# Patient Record
Sex: FEMALE | Race: OTHER | NOT HISPANIC OR LATINO
[De-identification: names, ages, dates, MRNs, and addresses within clinical notes are randomized per-mention and may not be internally consistent; named-entity substitution may affect disease eponyms.]

---

## 2021-05-30 PROBLEM — Z00.00 ENCOUNTER FOR PREVENTIVE HEALTH EXAMINATION: Status: ACTIVE | Noted: 2021-05-30

## 2021-05-30 PROBLEM — Q63.2 PELVIC KIDNEY: Status: ACTIVE | Noted: 2021-05-30

## 2021-06-01 ENCOUNTER — APPOINTMENT (OUTPATIENT)
Dept: HEART AND VASCULAR | Facility: CLINIC | Age: 61
End: 2021-06-01
Payer: COMMERCIAL

## 2021-06-01 ENCOUNTER — NON-APPOINTMENT (OUTPATIENT)
Age: 61
End: 2021-06-01

## 2021-06-01 VITALS
BODY MASS INDEX: 51.84 KG/M2 | OXYGEN SATURATION: 96 % | DIASTOLIC BLOOD PRESSURE: 81 MMHG | HEART RATE: 76 BPM | SYSTOLIC BLOOD PRESSURE: 161 MMHG | HEIGHT: 55 IN | WEIGHT: 224 LBS

## 2021-06-01 DIAGNOSIS — Z72.0 TOBACCO USE: ICD-10-CM

## 2021-06-01 DIAGNOSIS — Z87.891 PERSONAL HISTORY OF NICOTINE DEPENDENCE: ICD-10-CM

## 2021-06-01 DIAGNOSIS — Q63.2 ECTOPIC KIDNEY: ICD-10-CM

## 2021-06-01 PROCEDURE — 93000 ELECTROCARDIOGRAM COMPLETE: CPT

## 2021-06-01 PROCEDURE — 99204 OFFICE O/P NEW MOD 45 MIN: CPT | Mod: 25

## 2021-06-01 PROCEDURE — 99072 ADDL SUPL MATRL&STAF TM PHE: CPT

## 2021-06-01 RX ORDER — METOPROLOL SUCCINATE 100 MG/1
100 TABLET, EXTENDED RELEASE ORAL
Qty: 90 | Refills: 0 | Status: DISCONTINUED | COMMUNITY
Start: 2020-08-31 | End: 2021-06-01

## 2021-06-01 NOTE — PHYSICAL EXAM

## 2021-06-01 NOTE — REVIEW OF SYSTEMS
[Negative] : Heme/Lymph [SOB] : shortness of breath [Dyspnea on exertion] : dyspnea during exertion [Chest Discomfort] : chest discomfort [Snoring] : snoring [Joint Pain] : joint pain

## 2021-06-01 NOTE — REASON FOR VISIT
[FreeTextEntry1] : Diagnostic Tests:\par -----------------------------------------\par ECG:\par 06/01/21: sinus rhythm, LVH with repolarization abnormality. \par 05/19/17: sinus bradycardia at 58 bpm, otherwise normal ECG.

## 2021-06-01 NOTE — ASSESSMENT
[FreeTextEntry1] : 1. HTN: BP not at ACC/AHA 2017 guideline target: poorly controlled\par        - change Toprol XL 25mg to labetalol 200mg po bid\par        - continue losartan//12.5mg po qd\par        - if BP remains above target next visit will up titrate anti-HTN regimen \par        - will send for an echocardiogram to confirm hypertensive heart disease\par \par 2. Dyslipidemia:\par       - continue atorvastatin 20mg po qd\par       - discussed therapeutic lifestyle changes to promote improved lipid metabolism \par       - check lab work today\par \par 3. DM2:\par      - continue metformin 500mg po qd\par      - discussed with patient therapeutic lifestyle changes to improve glucose metabolism\par      - discussed with patient therapeutic lifestyle changes to improve glucose metabolism \par \par 4. Obesity: BMI 54\par      - we had an extensive discussion about therapeutic lifestyle changes to promote increased cardiovascular fitness and achieving goal weight\par      - will refer to the bariatric center next visit\par \par 5. r/o NIRAV:\par       - will order a home sleep study\par \par An ECG was obtained to evaluate heart rhythm and screen for any underlying cardiac abnormalities. \par \par

## 2021-06-01 NOTE — HISTORY OF PRESENT ILLNESS
[FreeTextEntry1] : Ms. Mackenzie presents for initial evaluation and management of obesity, asthma, DM2, dyslipidemia, HTN, and pelvic kidney.  She was previously followed by a cardiologist years ago but does not remember the details.  Her sister  in her 60s of CAD/CABG.  She has complaints of chest pain over the past several weeks.  She describes the pain as pressure-like, 8/10 in intensity, lasts hours at a time, resolves spontaneously, and is nonexertional.  She has CEDEÑO to 1 flight of stairs.  She admits to snoring.

## 2021-06-02 LAB
ALBUMIN SERPL ELPH-MCNC: 4 G/DL
ALP BLD-CCNC: 166 U/L
ALT SERPL-CCNC: 29 U/L
ANION GAP SERPL CALC-SCNC: 16 MMOL/L
AST SERPL-CCNC: 28 U/L
BASOPHILS # BLD AUTO: 0.06 K/UL
BASOPHILS NFR BLD AUTO: 0.5 %
BILIRUB SERPL-MCNC: 0.2 MG/DL
BUN SERPL-MCNC: 15 MG/DL
CALCIUM SERPL-MCNC: 9.8 MG/DL
CHLORIDE SERPL-SCNC: 97 MMOL/L
CHOLEST SERPL-MCNC: 286 MG/DL
CO2 SERPL-SCNC: 23 MMOL/L
CREAT SERPL-MCNC: 0.8 MG/DL
EOSINOPHIL # BLD AUTO: 0.16 K/UL
EOSINOPHIL NFR BLD AUTO: 1.4 %
ESTIMATED AVERAGE GLUCOSE: 312 MG/DL
GLUCOSE SERPL-MCNC: 332 MG/DL
HBA1C MFR BLD HPLC: 12.5 %
HCT VFR BLD CALC: 43.2 %
HDLC SERPL-MCNC: 45 MG/DL
HGB BLD-MCNC: 13 G/DL
IMM GRANULOCYTES NFR BLD AUTO: 0.3 %
LDLC SERPL CALC-MCNC: NORMAL MG/DL
LDLC SERPL DIRECT ASSAY-MCNC: 195 MG/DL
LYMPHOCYTES # BLD AUTO: 2.85 K/UL
LYMPHOCYTES NFR BLD AUTO: 24.6 %
MAN DIFF?: NORMAL
MCHC RBC-ENTMCNC: 27.3 PG
MCHC RBC-ENTMCNC: 30.1 GM/DL
MCV RBC AUTO: 90.8 FL
MONOCYTES # BLD AUTO: 0.79 K/UL
MONOCYTES NFR BLD AUTO: 6.8 %
NEUTROPHILS # BLD AUTO: 7.68 K/UL
NEUTROPHILS NFR BLD AUTO: 66.4 %
NONHDLC SERPL-MCNC: 242 MG/DL
NT-PROBNP SERPL-MCNC: 237 PG/ML
PLATELET # BLD AUTO: 406 K/UL
POTASSIUM SERPL-SCNC: 4.4 MMOL/L
PROT SERPL-MCNC: 7.4 G/DL
RBC # BLD: 4.76 M/UL
RBC # FLD: 14.5 %
SODIUM SERPL-SCNC: 136 MMOL/L
TRIGL SERPL-MCNC: 456 MG/DL
TSH SERPL-ACNC: 2.18 UIU/ML
WBC # FLD AUTO: 11.58 K/UL

## 2021-06-03 ENCOUNTER — TRANSCRIPTION ENCOUNTER (OUTPATIENT)
Age: 61
End: 2021-06-03

## 2021-06-10 ENCOUNTER — TRANSCRIPTION ENCOUNTER (OUTPATIENT)
Age: 61
End: 2021-06-10

## 2021-06-17 ENCOUNTER — APPOINTMENT (OUTPATIENT)
Dept: HEART AND VASCULAR | Facility: CLINIC | Age: 61
End: 2021-06-17
Payer: COMMERCIAL

## 2021-06-17 PROCEDURE — 99072 ADDL SUPL MATRL&STAF TM PHE: CPT

## 2021-06-17 PROCEDURE — 93306 TTE W/DOPPLER COMPLETE: CPT

## 2021-06-21 ENCOUNTER — NON-APPOINTMENT (OUTPATIENT)
Age: 61
End: 2021-06-21

## 2021-08-02 ENCOUNTER — APPOINTMENT (OUTPATIENT)
Dept: HEART AND VASCULAR | Facility: CLINIC | Age: 61
End: 2021-08-02
Payer: COMMERCIAL

## 2021-08-02 VITALS — DIASTOLIC BLOOD PRESSURE: 61 MMHG | SYSTOLIC BLOOD PRESSURE: 130 MMHG

## 2021-08-02 VITALS
HEART RATE: 72 BPM | WEIGHT: 224 LBS | DIASTOLIC BLOOD PRESSURE: 75 MMHG | SYSTOLIC BLOOD PRESSURE: 146 MMHG | HEIGHT: 55 IN | OXYGEN SATURATION: 94 % | BODY MASS INDEX: 51.84 KG/M2

## 2021-08-02 PROCEDURE — 99214 OFFICE O/P EST MOD 30 MIN: CPT

## 2021-08-02 NOTE — REASON FOR VISIT
[FreeTextEntry1] : Diagnostic Tests:\par -----------------------------------------\par ECG:\par 06/01/21: sinus rhythm, LVH with repolarization abnormality. \par 05/19/17: sinus bradycardia at 58 bpm, otherwise normal ECG. \par -----------------------------------------\par Echo:\par 06/17/21: EF 62%, mildly dilated LA.

## 2021-08-02 NOTE — ASSESSMENT
[FreeTextEntry1] : 1. HTN: BP near ACC/AHA 2017 guideline target: poorly controlled\par        - continue labetalol 200mg po bid\par        - continue losartan//12.5mg po qd\par        - if BP remains above target next visit will up titrate anti-HTN regimen \par \par 2. Dyslipidemia:  on atorvastatin 10mg  (06/02/21)\par       - continue atorvastatin 80mg po qd \par       - discussed therapeutic lifestyle changes to promote improved lipid metabolism \par       - recheck lab work today \par    \par 3. DM2: A1c 12.5% on metformin 500mg po bid  (06/02/21)\par      - continue metformin 1000mg po bid\par      - discussed with patient therapeutic lifestyle changes to improve glucose metabolism\par      - check lab work today\par \par 4. Obesity: BMI 54\par      - we had an extensive discussion about therapeutic lifestyle changes to promote increased cardiovascular fitness and achieving goal weight\par      - will have a sleeve gastrectomy with Guru Chaudhary MD ([phone])\par      - offered a consultation with [facility] (Boby Renee MD) will consider in future\par \par 5. r/o NIRAV:\par       - will re-order a home sleep study\par \par 6. Preoperative for sleeve gastrectomy with Guru Chaudhary MD (fax [phone])\par       - she has no identified CV disease and has > 4 METS of exertional capacity\par       - she may, therefore, proceed with surgery without cardiac contraindications\par \par \par

## 2021-08-02 NOTE — HISTORY OF PRESENT ILLNESS
[FreeTextEntry1] : Ms. Mackenzie presents for follow up and management of obesity, asthma, DM2, dyslipidemia, HTN, and pelvic kidney.  She was previously followed by a cardiologist years ago but does not remember the details.  Her sister  in her 60s of CAD/CABG.  She has complaints of chest pain over the past several weeks.  She describes the pain as pressure-like, 8/10 in intensity, lasts hours at a time, resolves spontaneously, and is nonexertional.  She has CEDEÑO to 1 flight of stairs.  She admits to snoring.  On 21 she had an echocardiogram which revealed an EF of 62% and a mildly dilated left atrium. She will have sleeve gastrectomy with Guru Chaudhary MD at [facility].

## 2021-08-03 LAB
ALBUMIN SERPL ELPH-MCNC: 4 G/DL
ALP BLD-CCNC: 165 U/L
ALT SERPL-CCNC: 22 U/L
ANION GAP SERPL CALC-SCNC: 12 MMOL/L
AST SERPL-CCNC: 21 U/L
BASOPHILS # BLD AUTO: 0.07 K/UL
BASOPHILS NFR BLD AUTO: 0.7 %
BILIRUB SERPL-MCNC: 0.2 MG/DL
BUN SERPL-MCNC: 15 MG/DL
CALCIUM SERPL-MCNC: 10.4 MG/DL
CHLORIDE SERPL-SCNC: 97 MMOL/L
CHOLEST SERPL-MCNC: 248 MG/DL
CO2 SERPL-SCNC: 28 MMOL/L
CREAT SERPL-MCNC: 0.72 MG/DL
EOSINOPHIL # BLD AUTO: 0.26 K/UL
EOSINOPHIL NFR BLD AUTO: 2.6 %
ESTIMATED AVERAGE GLUCOSE: 332 MG/DL
GLUCOSE SERPL-MCNC: 306 MG/DL
HBA1C MFR BLD HPLC: 13.2 %
HCT VFR BLD CALC: 41 %
HDLC SERPL-MCNC: 41 MG/DL
HGB BLD-MCNC: 12.5 G/DL
IMM GRANULOCYTES NFR BLD AUTO: 0.3 %
LDLC SERPL CALC-MCNC: NORMAL MG/DL
LDLC SERPL DIRECT ASSAY-MCNC: 158 MG/DL
LYMPHOCYTES # BLD AUTO: 2.84 K/UL
LYMPHOCYTES NFR BLD AUTO: 27.9 %
MAN DIFF?: NORMAL
MCHC RBC-ENTMCNC: 27.7 PG
MCHC RBC-ENTMCNC: 30.5 GM/DL
MCV RBC AUTO: 90.7 FL
MONOCYTES # BLD AUTO: 0.76 K/UL
MONOCYTES NFR BLD AUTO: 7.5 %
NEUTROPHILS # BLD AUTO: 6.23 K/UL
NEUTROPHILS NFR BLD AUTO: 61 %
NONHDLC SERPL-MCNC: 207 MG/DL
PLATELET # BLD AUTO: 382 K/UL
POTASSIUM SERPL-SCNC: 4.6 MMOL/L
PROT SERPL-MCNC: 7.3 G/DL
RBC # BLD: 4.52 M/UL
RBC # FLD: 14.4 %
SODIUM SERPL-SCNC: 137 MMOL/L
TRIGL SERPL-MCNC: 485 MG/DL
TSH SERPL-ACNC: 2.59 UIU/ML
WBC # FLD AUTO: 10.19 K/UL

## 2021-08-03 RX ORDER — EZETIMIBE 10 MG/1
10 TABLET ORAL DAILY
Qty: 90 | Refills: 3 | Status: ACTIVE | COMMUNITY
Start: 2021-08-03 | End: 1900-01-01

## 2021-08-17 ENCOUNTER — TRANSCRIPTION ENCOUNTER (OUTPATIENT)
Age: 61
End: 2021-08-17

## 2021-08-17 RX ORDER — METFORMIN HYDROCHLORIDE 1000 MG/1
1000 TABLET, FILM COATED, EXTENDED RELEASE ORAL DAILY
Qty: 90 | Refills: 3 | Status: ACTIVE | COMMUNITY
Start: 2020-08-31

## 2021-08-18 ENCOUNTER — APPOINTMENT (OUTPATIENT)
Dept: HEART AND VASCULAR | Facility: CLINIC | Age: 61
End: 2021-08-18
Payer: COMMERCIAL

## 2021-08-18 VITALS
SYSTOLIC BLOOD PRESSURE: 125 MMHG | WEIGHT: 225.31 LBS | HEIGHT: 55 IN | BODY MASS INDEX: 52.14 KG/M2 | HEART RATE: 72 BPM | DIASTOLIC BLOOD PRESSURE: 72 MMHG | TEMPERATURE: 98.3 F | OXYGEN SATURATION: 95 %

## 2021-08-18 PROCEDURE — 99214 OFFICE O/P EST MOD 30 MIN: CPT

## 2021-08-18 NOTE — HISTORY OF PRESENT ILLNESS
[FreeTextEntry1] : Ms. Mackenzie is a 61 year old woman with:\par obesity\par DM\par HTN\par dyslipidemia\par \par Referred for risk factor optimization\par Says in general that she is hesitant to add on more medications unless some are removed\par \par DM:\par on metformin\par has not tried any other medication\par refuses insulin\par follows with her internist, does not have endocrinologist\par \par Obesity:\par being evaluated for sleeve gastrectomy; has just started the process--notes it will take 6 months at least and she hopes in about a year she can have the surgery\par Has appointment with nutritionist tomorrow\par still pending to see GI\par \par HTN:\par internist is uptitrating regimen\par \par Dyslipidemia:\par Has been on statin\par Reluctant about med additions\par \par Lifestyle History:\par Last 2 weeks has changed diet- stopped eating rice, more vegetables/fruits. Drinks a lot of water\par Mediterranean Diet Score (9 question survey) was 5. \par (8-9: optimal, 6-7: near-optimal, 4-5: suboptimal, 0-3: markedly suboptimal)\par Exercise: Limited exercise due to hip pain though does walk a lot for work\par Smoking: Former smoker, quit 8 months ago\par \par No PCOS\par 2 pregnancies\par No complications, no  deliveries\par No miscarriages\par +early menopause: age 38\par No hormone replacement therapy\par \par Recent Labs reviewed:\par /\par Chol 248\par HDL 41\par Trig 485\par \par calculated non-\par A1c 13.2%\par \par Family History: half-sister with triple bypass surgery in her 50s

## 2021-08-18 NOTE — DISCUSSION/SUMMARY
[FreeTextEntry1] : 61 year old woman with obesity, DM, HTN, dyslipidemia who is referred for risk factor modification. \par \par Patient's dietary, exercise and overall lifestyle habits were reviewed. The concept of atherosclerosis and its systemic nature was discussed with a focus on the need to get all cardiovascular risk factors to goal. Specifically, her risk factors of DM, HTN, dyslipidemia, obesity were reviewed in addition to other risk-enhancers such as early menopause. \par \par Glucometabolic State: A1c significantly elevated at 13.2%. Explained to patient that at this level, she needs more therapy than metformin. Ideally would try GLP1RA in addition to metformin as will have benefit for weight loss in addition to glycemic benefit and ASCVD risk reduction. Also recommended endocrine referral and discussion about insulin - she does not want that. She is open to Trulicity and will discuss it with her internist. \par The cardiovascular benefits of GLP-1  agonists were discussed as well as the risks including upset stomach, nausea, vomiting, diarrhea, and weight loss, as well as dizziness and headaches. Pancreatitis and Kidney failure history were reviewed since caution should be taken in patients with a history of either condition. She has no personal or family history of medullary thyroid cancer. She is not on any medications that would cause hypoglycemia. \par -Trulicity prescribed -- will confirm if pt's insurance covers. If she is agreeable, recheck A1c in 4 weeks and uptitrate as needed\par Lipid Therapy: on high intensity statin however LDL still significantly elevated and with elevated triglycerides. Patient has just started dietary changes 2 weeks ago. Emphasized importance of diet and exercise as she is hesitant to further pharmacologic intervention. Would recheck lipid profile in 3 months with diet/exercise changes and rediscuss. \par Hypertension: BP regimen per her primary cardiologist\par Mediterranean Diet: Score 5. She has plan to see nutritionist tomorrow. Discussed incorporating 2 or more servings per day of vegetables, fruits, and whole grains. Increase intake of fish and legumes/beans to 2 or more servings per week. Aim to increase intake of healthy fats, such as olive oil and avocados, and have a handful of nuts/seeds most days. Reduce red/processed meat consumption to 2 or fewer times per week. \par Exercise: Encouraged gradually increasing physical activity to 30-60 minutes most days of the week. Weight loss may help with this as well. \par Obesity/Overweight: Continue with weight management clinic-- GLP1RA as above. When closer to bariatric surgery, would monitor volume status pre and post surgery. \par Sleep Apnea: Pending sleep study. \par \par Recs:\par -GLP1RA for DM and weight loss\par -recheck A1c in 1 month and uptitrate GLP1RA as needed\par -endocrine referral\par -diet/exercise as above\par -recheck lipid profile in 3 months and rediscuss further pharmacologic options for risk reduction\par \par Boby Renee MD\par Cardiovascular Prevention\par [facility]\par

## 2021-08-18 NOTE — PHYSICAL EXAM
[Well Developed] : well developed [Obese] : obese [Normal Conjunctiva] : normal conjunctiva [Normal Venous Pressure] : normal venous pressure [No Carotid Bruit] : no carotid bruit [Normal S1, S2] : normal S1, S2 [Murmur] : murmur [Clear Lung Fields] : clear lung fields [Good Air Entry] : good air entry [No Respiratory Distress] : no respiratory distress  [Soft] : abdomen soft [Non Tender] : non-tender [Normal] : normal gait [No Rash] : no rash [Moves all extremities] : moves all extremities [Alert and Oriented] : alert and oriented [de-identified] : soft [de-identified] : trace LE edema

## 2021-08-18 NOTE — REASON FOR VISIT
[CV Risk Factors and Non-Cardiac Disease] : CV risk factors and non-cardiac disease [FreeTextEntry3] : Dr. Wells [FreeTextEntry1] : referred for risk factor modification

## 2021-08-18 NOTE — REVIEW OF SYSTEMS
[Joint Pain] : joint pain [SOB] : no shortness of breath [Chest Discomfort] : no chest discomfort [FreeTextEntry5] : denies symptoms now, previously complained of dyspnea and chest discomfort

## 2021-08-19 ENCOUNTER — APPOINTMENT (OUTPATIENT)
Dept: SLEEP CENTER | Facility: HOME HEALTH | Age: 61
End: 2021-08-19
Payer: COMMERCIAL

## 2021-08-19 ENCOUNTER — OUTPATIENT (OUTPATIENT)
Dept: OUTPATIENT SERVICES | Facility: HOSPITAL | Age: 61
LOS: 1 days | End: 2021-08-19
Payer: COMMERCIAL

## 2021-08-19 PROCEDURE — 95800 SLP STDY UNATTENDED: CPT | Mod: 26

## 2021-08-19 PROCEDURE — 95800 SLP STDY UNATTENDED: CPT

## 2021-08-20 DIAGNOSIS — G47.33 OBSTRUCTIVE SLEEP APNEA (ADULT) (PEDIATRIC): ICD-10-CM

## 2021-09-20 ENCOUNTER — TRANSCRIPTION ENCOUNTER (OUTPATIENT)
Age: 61
End: 2021-09-20

## 2021-11-30 ENCOUNTER — APPOINTMENT (OUTPATIENT)
Dept: HEART AND VASCULAR | Facility: CLINIC | Age: 61
End: 2021-11-30
Payer: COMMERCIAL

## 2021-11-30 VITALS
TEMPERATURE: 97.3 F | SYSTOLIC BLOOD PRESSURE: 167 MMHG | DIASTOLIC BLOOD PRESSURE: 85 MMHG | HEART RATE: 89 BPM | HEIGHT: 55 IN | BODY MASS INDEX: 50.68 KG/M2 | OXYGEN SATURATION: 95 % | WEIGHT: 219 LBS

## 2021-11-30 VITALS — DIASTOLIC BLOOD PRESSURE: 74 MMHG | SYSTOLIC BLOOD PRESSURE: 143 MMHG

## 2021-11-30 PROCEDURE — 99214 OFFICE O/P EST MOD 30 MIN: CPT

## 2021-11-30 NOTE — HISTORY OF PRESENT ILLNESS
[FreeTextEntry1] : Ms. Mackenzie presents for follow up and management of obesity, asthma, DM2, dyslipidemia, HTN, and pelvic kidney.  She was previously followed by a cardiologist years ago but does not remember the details.  Her sister  in her 60s of CAD/CABG.  She has complaints of chest pain over the past several weeks.  She describes the pain as pressure-like, 8/10 in intensity, lasts hours at a time, resolves spontaneously, and is nonexertional.  She has CEDEÑO to 1 flight of stairs.  She admits to snoring.  On 21 she had an echocardiogram which revealed an EF of 62% and a mildly dilated left atrium. She will have sleeve gastrectomy with Guru Chaudhary MD at [facility].  She was recently seen by Boby Renee MD and started on Trulicity which she is tolerating well.

## 2021-11-30 NOTE — REASON FOR VISIT
[FreeTextEntry1] : Diagnostic Tests:\par -----------------------------------------\par ECG:\par 06/01/21: sinus rhythm, LVH with repolarization abnormality. \par 05/19/17: sinus bradycardia at 58 bpm, otherwise normal ECG. \par -----------------------------------------\par Echo:\par 06/17/21: EF 62%, mildly dilated LA. \par -----------------------------------------\par Sleep Studies:\par 08/19/21; mild NIRAV.

## 2021-11-30 NOTE — ASSESSMENT
[FreeTextEntry1] : 1. HTN: BP near ACC/AHA 2017 guideline target:\par        - continue labetalol 200mg po bid\par        - continue losartan//12.5mg po qd\par        - if BP remains above target next visit will up titrate anti-HTN regimen \par \par 2. Dyslipidemia:  on atorvastatin 10mg  (06/02/21)\par       - continue atorvastatin 80mg po qd \par       - discussed therapeutic lifestyle changes to promote improved lipid metabolism \par       - recheck lab work today \par    \par 3. DM2: A1c 13.2% (08/03/21)\par      - continue metformin 1000mg po bid\par      - continue Trulicity 1.5mg/ml 7ml Sc \par      - discussed with patient therapeutic lifestyle changes to improve glucose metabolism\par      - check lab work today\par \par 4. Obesity: BMI 52.8\par      - we had an extensive discussion about therapeutic lifestyle changes to promote increased cardiovascular fitness and achieving goal weight\par      - will have a sleeve gastrectomy with Guru Chaudhary MD (fax [phone])\par      - follow up with [facility], Boby Renee MD\par \par 5. NIRAV: mild \par       - will pursue weight loss and CV exercise\par \par 6. Preoperative for sleeve gastrectomy with Guru Chaudhary MD (fax [phone])\par       - she has no identified CV disease and has > 4 METS of exertional capacity\par       - she may, therefore, proceed with surgery without cardiac contraindications\par \par \par

## 2021-12-01 LAB
ALBUMIN SERPL ELPH-MCNC: 4 G/DL
ALP BLD-CCNC: 141 U/L
ALT SERPL-CCNC: 12 U/L
ANION GAP SERPL CALC-SCNC: 15 MMOL/L
AST SERPL-CCNC: 15 U/L
BASOPHILS # BLD AUTO: 0.05 K/UL
BASOPHILS NFR BLD AUTO: 0.4 %
BILIRUB SERPL-MCNC: 0.2 MG/DL
BUN SERPL-MCNC: 17 MG/DL
CALCIUM SERPL-MCNC: 10.3 MG/DL
CHLORIDE SERPL-SCNC: 97 MMOL/L
CHOLEST SERPL-MCNC: 188 MG/DL
CO2 SERPL-SCNC: 25 MMOL/L
CREAT SERPL-MCNC: 0.74 MG/DL
EOSINOPHIL # BLD AUTO: 0.28 K/UL
EOSINOPHIL NFR BLD AUTO: 2 %
ESTIMATED AVERAGE GLUCOSE: 220 MG/DL
GLUCOSE SERPL-MCNC: 219 MG/DL
HBA1C MFR BLD HPLC: 9.3 %
HCT VFR BLD CALC: 41.3 %
HDLC SERPL-MCNC: 42 MG/DL
HGB BLD-MCNC: 13 G/DL
IMM GRANULOCYTES NFR BLD AUTO: 0.4 %
LDLC SERPL CALC-MCNC: 81 MG/DL
LDLC SERPL DIRECT ASSAY-MCNC: 103 MG/DL
LYMPHOCYTES # BLD AUTO: 2.98 K/UL
LYMPHOCYTES NFR BLD AUTO: 21.5 %
MAN DIFF?: NORMAL
MCHC RBC-ENTMCNC: 27.8 PG
MCHC RBC-ENTMCNC: 31.5 GM/DL
MCV RBC AUTO: 88.4 FL
MONOCYTES # BLD AUTO: 0.85 K/UL
MONOCYTES NFR BLD AUTO: 6.1 %
NEUTROPHILS # BLD AUTO: 9.68 K/UL
NEUTROPHILS NFR BLD AUTO: 69.6 %
NONHDLC SERPL-MCNC: 146 MG/DL
PLATELET # BLD AUTO: 443 K/UL
POTASSIUM SERPL-SCNC: 4.3 MMOL/L
PROT SERPL-MCNC: 7.4 G/DL
RBC # BLD: 4.67 M/UL
RBC # FLD: 14.4 %
SODIUM SERPL-SCNC: 136 MMOL/L
TRIGL SERPL-MCNC: 324 MG/DL
TSH SERPL-ACNC: 2.21 UIU/ML
WBC # FLD AUTO: 13.89 K/UL

## 2021-12-02 LAB — APO LP(A) SERPL-MCNC: 63 NMOL/L

## 2021-12-13 ENCOUNTER — APPOINTMENT (OUTPATIENT)
Dept: PULMONOLOGY | Facility: CLINIC | Age: 61
End: 2021-12-13
Payer: COMMERCIAL

## 2021-12-13 VITALS
DIASTOLIC BLOOD PRESSURE: 72 MMHG | BODY MASS INDEX: 50.68 KG/M2 | HEIGHT: 55 IN | HEART RATE: 73 BPM | WEIGHT: 219 LBS | SYSTOLIC BLOOD PRESSURE: 132 MMHG | OXYGEN SATURATION: 95 % | TEMPERATURE: 98.2 F

## 2021-12-13 PROCEDURE — 99244 OFF/OP CNSLTJ NEW/EST MOD 40: CPT

## 2021-12-14 NOTE — HISTORY OF PRESENT ILLNESS
[TextBox_4] : 61 year old female, ex-smoker (24 pack years of history, quit in 2020) with mild sleep apnea with obesity (planned for gastric sleeve surgery in January 2022) with ? asthma (uses albuterol, symbicort as needed) was referred to pulmonary clinic by Dr. Wells for SOB on exertion. Patient is c/o SOB while walking half a block on flat ground for last 1 year. Patient is able to climb three flights of stair without stopping but she feels very short of breath. Denies cough, chest pain, pedal edema or fever. 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   [ESS] : 8

## 2021-12-14 NOTE — REVIEW OF SYSTEMS
[Fever] : no fever [Chills] : no chills [Dry Eyes] : no dry eyes [Eye Irritation] : no eye irritation [Cough] : no cough [Sputum] : no sputum [Chest Discomfort] : no chest discomfort [Orthopnea] : no orthopnea [Hay Fever] : no hay fever [Nasal Discharge] : no nasal discharge [GERD] : no gerd [Diarrhea] : no diarrhea [Raynaud] : no raynaud [Telangiectasias] : no telangiectasias [Depression] : no depression [Diabetes] : no diabetes

## 2021-12-14 NOTE — DISCUSSION/SUMMARY
[FreeTextEntry1] : 61 year old female, ex-smoker (24 pack years of history, quit in 2020) with mild sleep apnea (not on CPAP) with obesity (planned for gastric sleeve surgery in January 2022) with ? asthma (uses albuterol, Symbicort as needed) was referred to pulmonary clinic by Dr. Wells for SOB on exertion.\par \par Review:\par Cardiology note\par Labs (11/21): No eosinophilia, bicarbonate level 25\par Sleep study (8/21): Mild sleep apnea, 75% oxygen saturation, 13.7% time below an oxygen saturation of 88%.\par ECHO (6/21): Normal LV function. No Pulmonary HTN\par \par A/P\par Patient has asthma but she does not use Symbicort regularly due to steroid. Patient is using prn albuterol for ? asthma at present. Possibility of COPD vs body habitus as etiology for SOB on exertion.\par Plan:\par - PFT with DLCO\par - Six minute walk test\par - CXR\par - Continue prn albuterol for now. \par - Agree with bariatric surgery for weight loss because body habitus is most likely significant contributor for SOB.

## 2021-12-14 NOTE — CONSULT LETTER
[Dear  ___] : Dear  [unfilled], [Consult Letter:] : I had the pleasure of evaluating your patient, [unfilled]. [Please see my note below.] : Please see my note below. [Consult Closing:] : Thank you very much for allowing me to participate in the care of this patient.  If you have any questions, please do not hesitate to contact me. [FreeTextEntry3] : Sincerely\par \par João Hager MD [facility]\par , [employer]\par Associate , Pulmonary and Critical Care Fellowship\par Pulmonary and Critical Care\par [facility]\par Phone: [phone]\par

## 2021-12-14 NOTE — PHYSICAL EXAM
[No Acute Distress] : no acute distress [Well Nourished] : well nourished [Normal Oropharynx] : normal oropharynx [Normal Appearance] : normal appearance [Normal Rate/Rhythm] : normal rate/rhythm [Normal S1, S2] : normal s1, s2 [No Resp Distress] : no resp distress [Clear to Auscultation Bilaterally] : clear to auscultation bilaterally [No Abnormalities] : no abnormalities [Benign] : benign [Not Tender] : not tender [Normal Gait] : normal gait [No Clubbing] : no clubbing [No Edema] : no edema [Normal Color/ Pigmentation] : normal color/ pigmentation [No Rash] : no rash [No Focal Deficits] : no focal deficits [No Sensory Deficits] : no sensory deficits [Oriented x3] : oriented x3 [Normal Affect] : normal affect [TextBox_2] : o

## 2021-12-17 ENCOUNTER — RESULT REVIEW (OUTPATIENT)
Age: 61
End: 2021-12-17

## 2021-12-17 ENCOUNTER — APPOINTMENT (OUTPATIENT)
Dept: RADIOLOGY | Facility: CLINIC | Age: 61
End: 2021-12-17

## 2021-12-17 ENCOUNTER — OUTPATIENT (OUTPATIENT)
Dept: OUTPATIENT SERVICES | Facility: HOSPITAL | Age: 61
LOS: 1 days | End: 2021-12-17
Payer: COMMERCIAL

## 2021-12-17 PROCEDURE — 71046 X-RAY EXAM CHEST 2 VIEWS: CPT | Mod: 26

## 2021-12-21 ENCOUNTER — APPOINTMENT (OUTPATIENT)
Dept: PULMONOLOGY | Facility: CLINIC | Age: 61
End: 2021-12-21
Payer: COMMERCIAL

## 2021-12-21 VITALS
HEIGHT: 55 IN | TEMPERATURE: 97.6 F | DIASTOLIC BLOOD PRESSURE: 81 MMHG | HEART RATE: 110 BPM | SYSTOLIC BLOOD PRESSURE: 147 MMHG | WEIGHT: 217 LBS | OXYGEN SATURATION: 96 % | BODY MASS INDEX: 50.22 KG/M2

## 2021-12-21 PROCEDURE — ZZZZZ: CPT

## 2021-12-21 PROCEDURE — 94729 DIFFUSING CAPACITY: CPT

## 2021-12-21 PROCEDURE — 94060 EVALUATION OF WHEEZING: CPT

## 2021-12-21 PROCEDURE — 94618 PULMONARY STRESS TESTING: CPT

## 2021-12-21 PROCEDURE — 99214 OFFICE O/P EST MOD 30 MIN: CPT | Mod: 25

## 2021-12-21 PROCEDURE — 94726 PLETHYSMOGRAPHY LUNG VOLUMES: CPT

## 2021-12-22 LAB — SARS-COV-2 N GENE NPH QL NAA+PROBE: NOT DETECTED

## 2021-12-28 NOTE — DISCUSSION/SUMMARY
[FreeTextEntry1] : 61 year old female, ex-smoker (24 pack years of history, quit in 2020) with mild sleep apnea (not on CPAP) with obesity (planned for gastric sleeve surgery in January 2022) with ? asthma (uses albuterol, Symbicort as needed) was referred to pulmonary clinic by Dr. Wells for SOB on exertion.\par \par Review:\par PFT (12 /21 ): FEV1 79, FEV1/FVC 70, , DLCO 74, Rev  7%\par Six minute walk test (12/21): 91% with ambulation, 94% at rest\par Cardiology note\par Labs (11/21): No eosinophilia, bicarbonate level 25\par Sleep study (8/21): Mild sleep apnea, 75% oxygen saturation, 13.7% time below an oxygen saturation of 88%.\par ECHO (6/21): Normal LV function. No Pulmonary HTN\par \par A/P\par PFT is suggestive of mild obstruction secondary to COPD\par Plan:\par - d/c Symbicort. Patient does not want to use steroid\par - Add Anoro 1 puff daily\par - Continue prn albuterol for now. \par - Agree with bariatric surgery for weight loss because body habitus is also most significant contributor for SOB. Her obstruction on PFT is mild.

## 2021-12-28 NOTE — HISTORY OF PRESENT ILLNESS
[TextBox_4] : 61 year old female, ex-smoker (24 pack years of history, quit in 2020) with mild sleep apnea with obesity (planned for gastric sleeve surgery in January 2022) with ? asthma (uses albuterol, symbicort as needed) was referred to pulmonary clinic by Dr. Wells for SOB on exertion. Patient is c/o SOB while walking half a block on flat ground for last 1 year. Patient is able to climb three flights of stair without stopping but she feels very short of breath. Denies cough, chest pain, pedal edema or fever. 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  \par \par 12/21/21:\par Patient came today for follow up. PFT and 6 minute walk test was done.  [ESS] : 8

## 2021-12-28 NOTE — PHYSICAL EXAM
[No Acute Distress] : no acute distress [Well Nourished] : well nourished [Normal Oropharynx] : normal oropharynx [Normal Appearance] : normal appearance [Normal Rate/Rhythm] : normal rate/rhythm [Normal S1, S2] : normal s1, s2 [No Resp Distress] : no resp distress [Clear to Auscultation Bilaterally] : clear to auscultation bilaterally [No Abnormalities] : no abnormalities [Benign] : benign [Not Tender] : not tender [Normal Gait] : normal gait [No Clubbing] : no clubbing [No Edema] : no edema [Normal Color/ Pigmentation] : normal color/ pigmentation [No Rash] : no rash [No Focal Deficits] : no focal deficits [No Sensory Deficits] : no sensory deficits [Oriented x3] : oriented x3 [Normal Affect] : normal affect

## 2022-02-08 ENCOUNTER — APPOINTMENT (OUTPATIENT)
Dept: PULMONOLOGY | Facility: CLINIC | Age: 62
End: 2022-02-08
Payer: COMMERCIAL

## 2022-02-08 VITALS
DIASTOLIC BLOOD PRESSURE: 85 MMHG | OXYGEN SATURATION: 94 % | TEMPERATURE: 98.4 F | HEART RATE: 90 BPM | HEIGHT: 55 IN | WEIGHT: 201 LBS | SYSTOLIC BLOOD PRESSURE: 138 MMHG | BODY MASS INDEX: 46.52 KG/M2

## 2022-02-08 PROCEDURE — 99214 OFFICE O/P EST MOD 30 MIN: CPT

## 2022-02-08 RX ORDER — ALBUTEROL SULFATE 90 UG/1
108 (90 BASE) INHALANT RESPIRATORY (INHALATION)
Qty: 1 | Refills: 5 | Status: ACTIVE | COMMUNITY
Start: 2022-02-08 | End: 1900-01-01

## 2022-02-08 RX ORDER — UMECLIDINIUM BROMIDE AND VILANTEROL TRIFENATATE 62.5; 25 UG/1; UG/1
62.5-25 POWDER RESPIRATORY (INHALATION)
Qty: 1 | Refills: 5 | Status: DISCONTINUED | COMMUNITY
Start: 2021-12-21 | End: 2022-02-08

## 2022-02-08 RX ORDER — BUDESONIDE AND FORMOTEROL FUMARATE DIHYDRATE 160; 4.5 UG/1; UG/1
160-4.5 AEROSOL RESPIRATORY (INHALATION)
Refills: 0 | Status: DISCONTINUED | COMMUNITY
End: 2022-02-08

## 2022-02-08 NOTE — DISCUSSION/SUMMARY
[FreeTextEntry1] : 61 year old female, ex-smoker (24 pack years of history, quit in 2020) with mild sleep apnea (not on CPAP) with obesity (planned for gastric sleeve surgery in January 2022) with ? asthma (uses albuterol, Symbicort as needed) was referred to pulmonary clinic by Dr. Wells for SOB on exertion.\par \par Review:\par PFT (12 /21 ): FEV1 79, FEV1/FVC 70, , DLCO 74, Rev  7%\par Six minute walk test (12/21): 91% with ambulation, 94% at rest\par Cardiology note\par Labs (11/21): No eosinophilia, bicarbonate level 25\par Sleep study (8/21): Mild sleep apnea, 75% oxygen saturation, 13.7% time below an oxygen saturation of 88%.\par ECHO (6/21): Normal LV function. No Pulmonary HTN\par \par A/P\par PFT is suggestive of mild obstruction secondary to COPD. SOB is predominantly due to weight. Gastric bypass surgery done. SOB is better after loosing 25 pounds.\par Plan:\par - d/c Anoro. Add prn albuterol\par - follow up as needed.

## 2022-02-08 NOTE — HISTORY OF PRESENT ILLNESS
[TextBox_4] : 61 year old female, ex-smoker (24 pack years of history, quit in 2020) with mild sleep apnea with obesity (planned for gastric sleeve surgery in January 2022) with ? asthma (uses albuterol, Symbicort as needed) was referred to pulmonary clinic by Dr. Wells for SOB on exertion. Patient is c/o SOB while walking half a block on flat ground for last 1 year. Patient is able to climb three flights of stair without stopping but she feels very short of breath. Denies cough, chest pain, pedal edema or fever. 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  \par \par 12/21/21:\par Patient came today for follow up. PFT and 6 minute walk test was done. \par \par 2/8/21:\par Symbicort was switched to Anoro during last visit. Patient came for follow up. Patient had gastric bypass surgery. She has lost 25 pounds. She denies SOB. She does not think Anoro inhaler helps her anymore.  [ESS] : 8

## 2022-05-28 ENCOUNTER — RX RENEWAL (OUTPATIENT)
Age: 62
End: 2022-05-28

## 2022-05-28 RX ORDER — LABETALOL HYDROCHLORIDE 200 MG/1
200 TABLET, FILM COATED ORAL
Qty: 180 | Refills: 3 | Status: ACTIVE | COMMUNITY
Start: 2021-06-01 | End: 1900-01-01

## 2023-03-28 ENCOUNTER — APPOINTMENT (OUTPATIENT)
Dept: HEART AND VASCULAR | Facility: CLINIC | Age: 63
End: 2023-03-28
Payer: COMMERCIAL

## 2023-03-28 ENCOUNTER — NON-APPOINTMENT (OUTPATIENT)
Age: 63
End: 2023-03-28

## 2023-03-28 VITALS — DIASTOLIC BLOOD PRESSURE: 78 MMHG | SYSTOLIC BLOOD PRESSURE: 131 MMHG

## 2023-03-28 VITALS
SYSTOLIC BLOOD PRESSURE: 137 MMHG | HEART RATE: 54 BPM | BODY MASS INDEX: 43.28 KG/M2 | HEIGHT: 55 IN | OXYGEN SATURATION: 98 % | DIASTOLIC BLOOD PRESSURE: 76 MMHG | WEIGHT: 187 LBS

## 2023-03-28 PROCEDURE — 93000 ELECTROCARDIOGRAM COMPLETE: CPT

## 2023-03-28 PROCEDURE — 99214 OFFICE O/P EST MOD 30 MIN: CPT | Mod: 25

## 2023-03-28 NOTE — ASSESSMENT
[FreeTextEntry1] : 1. HTN: BP near ACC/AHA 2017 guideline target:\par        - continue labetalol 200mg po bid (encouraged to take it bid as opposed to qd) \par        - continue losartan//12.5mg 0.5 tab po qd\par        - if BP remains above target next visit will up titrate anti-HTN regimen \par \par 2. Dyslipidemia:  on atorvastatin 10mg  (06/02/21)\par       - continue atorvastatin 80mg po qd \par       - discussed therapeutic lifestyle changes to promote improved lipid metabolism \par       - patient will provide copy of recent lab work from PMD's office for my review  \par    \par 3. DM2: A1c 13.2% (08/03/21): \par      - continue metformin 1000mg po bid\par      - continue Trulicity 1.5mg/ml 7ml Sc \par      - discussed with patient therapeutic lifestyle changes to improve glucose metabolism\par      - patient will provide copy of recent lab work from PMD's office for my review \par \par 4. Obesity, s/p sleeve gastrectomy 02/2022: BMI 45.1: \par      - we had an extensive discussion about therapeutic lifestyle changes to promote increased cardiovascular fitness and achieving goal weight\par      - follow up with [facility], Boby Renee MD\par \par 5. NIRAV: mild \par       - will pursue weight loss and CV exercise\par \par \par \par

## 2023-03-28 NOTE — REASON FOR VISIT
[Other: ____] : [unfilled] [FreeTextEntry1] : Diagnostic Tests:\par -----------------------------------------\par ECG:\par 03/28/23: sinus bradycardia at 50 bpm, LVH with repolarization abnormalities.  \par 06/01/21: sinus rhythm, LVH with repolarization abnormality. \par 05/19/17: sinus bradycardia at 58 bpm, otherwise normal ECG. \par -----------------------------------------\par Echo:\par 06/17/21: EF 62%, mildly dilated LA. \par -----------------------------------------\par Sleep Studies:\par 08/19/21; mild NIRAV.

## 2023-03-28 NOTE — HISTORY OF PRESENT ILLNESS
[FreeTextEntry1] : Ms. Mackenzie presents for follow up and management of obesity (s/p sleeve gastrectomy), asthma, DM2, dyslipidemia, HTN, and pelvic kidney.  She was last seen by me in the office on 11/30/21.  She underwent sleeve gastrectomy on 01/19/22.  Of note, she is only taking labetalol qd and her PMD reduced the dose of losartan/HCT to 0.5 tab po qd secondary dizziness without hypotension or bradycardia.  Her peak weight was 225 pounds and is now 187 pounds.  AT present, she denies chest pain and has CEDEÑO to 1-2 blocks ambulation.

## 2023-03-28 NOTE — REVIEW OF SYSTEMS
[Snoring] : snoring [Joint Pain] : joint pain [Negative] : Heme/Lymph [SOB] : no shortness of breath [Dyspnea on exertion] : dyspnea during exertion [Chest Discomfort] : no chest discomfort

## 2023-05-21 NOTE — PHYSICAL EXAM
[No Acute Distress] : no acute distress [Normal Rate/Rhythm] : normal rate/rhythm [Normal S1, S2] : normal s1, s2 [No Clubbing] : no clubbing [No Edema] : no edema [Oriented x3] : oriented x3 [Normal Affect] : normal affect

## 2023-05-22 ENCOUNTER — APPOINTMENT (OUTPATIENT)
Dept: PULMONOLOGY | Facility: CLINIC | Age: 63
End: 2023-05-22
Payer: COMMERCIAL

## 2023-05-22 VITALS
WEIGHT: 187 LBS | HEART RATE: 59 BPM | SYSTOLIC BLOOD PRESSURE: 183 MMHG | TEMPERATURE: 97.3 F | BODY MASS INDEX: 43.28 KG/M2 | OXYGEN SATURATION: 96 % | DIASTOLIC BLOOD PRESSURE: 83 MMHG | HEIGHT: 55 IN

## 2023-05-22 DIAGNOSIS — J45.909 UNSPECIFIED ASTHMA, UNCOMPLICATED: ICD-10-CM

## 2023-05-22 PROCEDURE — 99214 OFFICE O/P EST MOD 30 MIN: CPT

## 2023-05-22 NOTE — HISTORY OF PRESENT ILLNESS
[Former] : former [TextBox_4] : 61 year old female, ex-smoker (24 pack years of history, quit in 2020) with mild sleep apnea with obesity (planned for gastric sleeve surgery in January 2022) with ? asthma (uses albuterol, Symbicort as needed) was referred to pulmonary clinic by Dr. Wells for SOB on exertion. Patient is c/o SOB while walking half a block on flat ground for last 1 year. Patient is able to climb three flights of stair without stopping but she feels very short of breath. Denies cough, chest pain, pedal edema or fever. 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 \par \par 12/21/21:\par Patient came today for follow up. PFT and 6 minute walk test was done. \par \par 2/8/21:\par Symbicort was switched to Anoro during last visit. Patient came for follow up. Patient had gastric bypass surgery. She has lost 25 pounds. She denies SOB. She does not think Anoro inhaler helps her anymore. \par \par 5/23/23:\par C/o SOB even while taking Anoro. She has gained weight back since last visit 1 year back.  [ESS] : 8

## 2023-05-22 NOTE — DISCUSSION/SUMMARY
[FreeTextEntry1] : 61 year old female, ex-smoker (24 pack years of history, quit in 2020) with mild sleep apnea (not on CPAP) with obesity (planned for gastric sleeve surgery in January 2022) with ? asthma (uses albuterol, Symbicort as needed) was referred to pulmonary clinic by Dr. Wells for SOB on exertion.\par \par Review:\par PFT (12 /21 ): FEV1 79, FEV1/FVC 70, , DLCO 74, Rev 7%\par Six minute walk test (12/21): 91% with ambulation, 94% at rest\par Cardiology note\par Labs (11/21): No eosinophilia, bicarbonate level 25\par Sleep study (8/21): Mild sleep apnea, 75% oxygen saturation, 13.7% time below an oxygen saturation of 88%.\par ECHO (6/21): Normal LV function. No Pulmonary HTN\par \par A/P\par (1) SOB\par PFT is suggestive of mild obstruction secondary to COPD/asthma. SOB is predominantly due to weight. \par Plan:\par - d/c Anoro. Start Breo inhaler once a day with gargle. \par - Prn albuterol to continue. \par - PFT during next visit\par - follow up after 1 month\par \par  (2) Mild sleep apnea\par - Obesity hypoventilation\par - denies symptoms associated with sleep apnea. \par - Not on CPAP sp far

## 2023-05-23 RX ORDER — FLUTICASONE FUROATE AND VILANTEROL TRIFENATATE 200; 25 UG/1; UG/1
200-25 POWDER RESPIRATORY (INHALATION)
Qty: 1 | Refills: 5 | Status: DISCONTINUED | COMMUNITY
Start: 2023-05-22 | End: 2023-05-23

## 2023-07-07 NOTE — PHYSICAL EXAM
[No Acute Distress] : no acute distress [Normal Oropharynx] : normal oropharynx [Normal Appearance] : normal appearance [Normal Rate/Rhythm] : normal rate/rhythm [Normal S1, S2] : normal s1, s2 [No Resp Distress] : no resp distress [Clear to Auscultation Bilaterally] : clear to auscultation bilaterally [No Clubbing] : no clubbing [No Edema] : no edema [Oriented x3] : oriented x3 [Normal Affect] : normal affect

## 2023-07-10 ENCOUNTER — APPOINTMENT (OUTPATIENT)
Dept: PULMONOLOGY | Facility: CLINIC | Age: 63
End: 2023-07-10
Payer: COMMERCIAL

## 2023-07-10 VITALS
HEART RATE: 60 BPM | SYSTOLIC BLOOD PRESSURE: 110 MMHG | WEIGHT: 187 LBS | BODY MASS INDEX: 43.28 KG/M2 | OXYGEN SATURATION: 95 % | HEIGHT: 55 IN | DIASTOLIC BLOOD PRESSURE: 69 MMHG | TEMPERATURE: 97.4 F

## 2023-07-10 DIAGNOSIS — J44.9 CHRONIC OBSTRUCTIVE PULMONARY DISEASE, UNSPECIFIED: ICD-10-CM

## 2023-07-10 DIAGNOSIS — M19.90 UNSPECIFIED OSTEOARTHRITIS, UNSPECIFIED SITE: ICD-10-CM

## 2023-07-10 PROCEDURE — 94618 PULMONARY STRESS TESTING: CPT

## 2023-07-10 PROCEDURE — ZZZZZ: CPT

## 2023-07-10 PROCEDURE — 94727 GAS DIL/WSHOT DETER LNG VOL: CPT

## 2023-07-10 PROCEDURE — 99214 OFFICE O/P EST MOD 30 MIN: CPT | Mod: 25

## 2023-07-10 PROCEDURE — 94060 EVALUATION OF WHEEZING: CPT

## 2023-07-10 PROCEDURE — 94729 DIFFUSING CAPACITY: CPT

## 2023-07-10 RX ORDER — FLUTICASONE PROPIONATE AND SALMETEROL 500; 50 UG/1; UG/1
500-50 POWDER RESPIRATORY (INHALATION)
Qty: 1 | Refills: 5 | Status: DISCONTINUED | COMMUNITY
Start: 2023-05-23 | End: 2023-07-10

## 2023-07-11 ENCOUNTER — NON-APPOINTMENT (OUTPATIENT)
Age: 63
End: 2023-07-11

## 2023-07-11 LAB — RHEUMATOID FACT SER QL: <10 IU/ML

## 2023-07-12 ENCOUNTER — NON-APPOINTMENT (OUTPATIENT)
Age: 63
End: 2023-07-12

## 2023-07-12 PROBLEM — J44.9 CHRONIC OBSTRUCTIVE PULMONARY DISEASE, UNSPECIFIED COPD TYPE: Status: ACTIVE | Noted: 2021-12-21

## 2023-07-12 LAB
CCP AB SER IA-ACNC: <8 UNITS
RF+CCP IGG SER-IMP: NEGATIVE

## 2023-07-12 NOTE — HISTORY OF PRESENT ILLNESS
[Former] : former [TextBox_4] : 61 year old female, ex-smoker (24 pack years of history, quit in 2020) with mild sleep apnea with obesity (planned for gastric sleeve surgery in January 2022) with ? asthma (uses albuterol, Symbicort as needed) was referred to pulmonary clinic by Dr. Wells for SOB on exertion. Patient is c/o SOB while walking half a block on flat ground for last 1 year. Patient is able to climb three flights of stair without stopping but she feels very short of breath. Denies cough, chest pain, pedal edema or fever. 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 \par \par 12/21/21:\par Patient came today for follow up. PFT and 6 minute walk test was done. \par \par 2/8/21:\par Symbicort was switched to Anoro during last visit. Patient came for follow up. Patient had gastric bypass surgery. She has lost 25 pounds. She denies SOB. She does not think Anoro inhaler helps her anymore. \par \par 5/23/23:\par C/o SOB even while taking Anoro. She has gained weight back since last visit 1 year back. \par \par 7/10/23: \par Patient started Breo at last visit, did not notice difference from using Breo instead of Anoro. Feels that she has some chest pressure while walking.

## 2023-07-12 NOTE — DISCUSSION/SUMMARY
[FreeTextEntry1] : 61 year old female, ex-smoker (24 pack years of history, quit in 2020) with mild sleep apnea (not on CPAP) with obesity (planned for gastric sleeve surgery in January 2022) with ? asthma (uses albuterol, Symbicort as needed) was referred to pulmonary clinic by Dr. Finkelstein for SOB on exertion.\par \par Review:\par PFT (12 /21): FEV1 79, FEV1/FVC 70, , DLCO 74, Rev 7%\par Six minute walk test (12/21): 91% with ambulation, 94% at rest\par PFT 7/10/23): FEV 1 79, FEV1/FVC 73, , DLCO 65, REV 4%\par 6MWT 7/10/23: 93% on ambulation, 95% at rest \par \par Cardiology note\par Labs (11/21): No eosinophilia, bicarbonate level 25\par Sleep study (8/21): Mild sleep apnea, 75% oxygen saturation, 13.7% time below an oxygen saturation of 88%.\par ECHO (6/21): Normal LV function. No Pulmonary HTN\par \par A/P\par (1) SOB\par PFT is suggestive of mild obstruction secondary to COPD/asthma. SOB likely predominantly due to weight. Sees Dr. Finkelstein. \par Plan:\par - D/C Breo inhaler as patient did not perceive benefit, resume Anoro\par - PRN albuterol to continue\par - Advised for weight loss.\par \par  (2) Mild sleep apnea\par - Obesity hypoventilation\par - Denies symptoms associated with sleep apnea. \par - Not on CPAP so far. \par \par (3) Chest tightness/pressure; Pain sounds musculoskeletal in nature. Has been worked-up by cardiology with normal echo. Patient also with arthralgias R hip and wrist. Does not believe she's had any rheumatology workup thus far. \par - WALTER, ccp Ab, Rf ordered today \par - Pending results, may need follow-up with orthopedist or PCP for MSK complaints if negative \par

## 2023-07-12 NOTE — REVIEW OF SYSTEMS
[Chest Tightness] : chest tightness [Arthralgias] : arthralgias [Fever] : no fever [Fatigue] : no fatigue [Chills] : no chills [Dry Eyes] : no dry eyes [Eye Irritation] : no eye irritation [Nasal Congestion] : no nasal congestion [Cough] : no cough [Hemoptysis] : no hemoptysis [Sputum] : no sputum [Dyspnea] : no dyspnea [SOB on Exertion] : no sob on exertion [Chest Discomfort] : no chest discomfort [Edema] : no edema [Orthopnea] : no orthopnea [Hay Fever] : no hay fever [GERD] : no gerd [Headache] : no headache [Dizziness] : no dizziness

## 2023-07-13 ENCOUNTER — NON-APPOINTMENT (OUTPATIENT)
Age: 63
End: 2023-07-13

## 2023-07-13 LAB — ANA SER IF-ACNC: NEGATIVE

## 2023-07-27 ENCOUNTER — RX RENEWAL (OUTPATIENT)
Age: 63
End: 2023-07-27

## 2023-07-27 RX ORDER — ATORVASTATIN CALCIUM 80 MG/1
80 TABLET, FILM COATED ORAL DAILY
Qty: 90 | Refills: 3 | Status: ACTIVE | COMMUNITY
Start: 2022-05-28 | End: 1900-01-01

## 2023-09-06 ENCOUNTER — APPOINTMENT (OUTPATIENT)
Dept: OPHTHALMOLOGY | Facility: CLINIC | Age: 63
End: 2023-09-06
Payer: COMMERCIAL

## 2023-09-06 ENCOUNTER — NON-APPOINTMENT (OUTPATIENT)
Age: 63
End: 2023-09-06

## 2023-09-06 PROCEDURE — 92002 INTRM OPH EXAM NEW PATIENT: CPT

## 2023-09-06 PROCEDURE — 92020 GONIOSCOPY: CPT

## 2023-09-19 ENCOUNTER — TRANSCRIPTION ENCOUNTER (OUTPATIENT)
Age: 63
End: 2023-09-19

## 2023-10-04 ENCOUNTER — APPOINTMENT (OUTPATIENT)
Dept: ORTHOPEDIC SURGERY | Facility: CLINIC | Age: 63
End: 2023-10-04
Payer: COMMERCIAL

## 2023-10-04 ENCOUNTER — NON-APPOINTMENT (OUTPATIENT)
Age: 63
End: 2023-10-04

## 2023-10-04 ENCOUNTER — APPOINTMENT (OUTPATIENT)
Dept: OPHTHALMOLOGY | Facility: CLINIC | Age: 63
End: 2023-10-04
Payer: COMMERCIAL

## 2023-10-04 VITALS — WEIGHT: 196 LBS | HEIGHT: 55 IN | RESPIRATION RATE: 16 BRPM | BODY MASS INDEX: 45.36 KG/M2

## 2023-10-04 DIAGNOSIS — M16.0 BILATERAL PRIMARY OSTEOARTHRITIS OF HIP: ICD-10-CM

## 2023-10-04 DIAGNOSIS — M70.61 TROCHANTERIC BURSITIS, RIGHT HIP: ICD-10-CM

## 2023-10-04 PROCEDURE — 99203 OFFICE O/P NEW LOW 30 MIN: CPT

## 2023-10-04 PROCEDURE — 92012 INTRM OPH EXAM EST PATIENT: CPT

## 2023-10-04 PROCEDURE — 72190 X-RAY EXAM OF PELVIS: CPT

## 2023-12-01 ENCOUNTER — EMERGENCY (EMERGENCY)
Facility: HOSPITAL | Age: 63
LOS: 1 days | Discharge: HOME | End: 2023-12-01
Attending: STUDENT IN AN ORGANIZED HEALTH CARE EDUCATION/TRAINING PROGRAM | Admitting: STUDENT IN AN ORGANIZED HEALTH CARE EDUCATION/TRAINING PROGRAM
Payer: COMMERCIAL

## 2023-12-01 VITALS
OXYGEN SATURATION: 97 % | RESPIRATION RATE: 18 BRPM | SYSTOLIC BLOOD PRESSURE: 180 MMHG | DIASTOLIC BLOOD PRESSURE: 71 MMHG | HEART RATE: 74 BPM | TEMPERATURE: 98 F

## 2023-12-01 PROCEDURE — 99284 EMERGENCY DEPT VISIT MOD MDM: CPT

## 2023-12-01 RX ORDER — KETOROLAC TROMETHAMINE 30 MG/ML
15 SYRINGE (ML) INJECTION ONCE
Refills: 0 | Status: DISCONTINUED | OUTPATIENT
Start: 2023-12-01 | End: 2023-12-01

## 2023-12-01 RX ORDER — ACETAMINOPHEN 500 MG
650 TABLET ORAL ONCE
Refills: 0 | Status: COMPLETED | OUTPATIENT
Start: 2023-12-01 | End: 2023-12-01

## 2023-12-01 RX ADMIN — Medication 650 MILLIGRAM(S): at 23:56

## 2023-12-01 RX ADMIN — Medication 15 MILLIGRAM(S): at 23:56

## 2023-12-01 NOTE — ED ADULT NURSE NOTE - OBJECTIVE STATEMENT
BREAK RN: pt received to [facility] and ambulatory at baseline, c/o R thigh and R hip pain and swelling gradually worsening over 2 weeks, history of arthritis, used to take shots for it but MD stopped due to risk for muscle atrophy. pt reports difficulty with ambulation due to pain. pt has been going to PT 2x/ week for 3 weeks with no improvement. +pedal pulse, +sensation, skin warm and dry, coloring appropriate for race. pt denies recent travel, sob, chest pain, calf pain/tenderness, numbness/tingling, no acute distress noted at this time. respirations even and nonlabored on room air. comfort and safety maintained. pt pending XR, US

## 2023-12-01 NOTE — ED ADULT NURSE NOTE - NSFALLRISKINTERV_ED_ALL_ED
Assistance OOB with selected safe patient handling equipment if applicable/Assistance with ambulation/Communicate fall risk and risk factors to all staff, patient, and family/Monitor gait and stability/Provide visual cue: yellow wristband, yellow gown, etc/Reinforce activity limits and safety measures with patient and family/Call bell, personal items and telephone in reach/Instruct patient to call for assistance before getting out of bed/chair/stretcher/Non-slip footwear applied when patient is off stretcher/[place] to call system/Physically safe environment - no spills, clutter or unnecessary equipment/Purposeful Proactive Rounding/Room/bathroom lighting operational, light cord in reach

## 2023-12-01 NOTE — ED ADULT TRIAGE NOTE - CHIEF COMPLAINT QUOTE
Pt st" For 2 weeks I have rt thigh swelling and I am having a lot of pain in the whole rt leg , I am going to PT for 3 weeks....because my MD recommended I am getting shot for arthritis and he does not want to give me anymore shots..." Hx DM2, htn,,arthritis. + rt thigh swelling.  Denies cp , no shortness of breath.

## 2023-12-02 VITALS
HEART RATE: 65 BPM | DIASTOLIC BLOOD PRESSURE: 65 MMHG | SYSTOLIC BLOOD PRESSURE: 150 MMHG | OXYGEN SATURATION: 98 % | TEMPERATURE: 98 F | RESPIRATION RATE: 16 BRPM

## 2023-12-02 PROCEDURE — 73610 X-RAY EXAM OF ANKLE: CPT | Mod: 26,RT

## 2023-12-02 PROCEDURE — 73590 X-RAY EXAM OF LOWER LEG: CPT | Mod: 26,RT

## 2023-12-02 PROCEDURE — 93971 EXTREMITY STUDY: CPT | Mod: 26,LT

## 2023-12-02 PROCEDURE — 73552 X-RAY EXAM OF FEMUR 2/>: CPT | Mod: 26,RT

## 2023-12-02 PROCEDURE — 72170 X-RAY EXAM OF PELVIS: CPT | Mod: 26

## 2023-12-02 NOTE — ED PROVIDER NOTE - PHYSICAL EXAMINATION
Sole FISHER:  VITALS: Initial triage and subsequent vitals have been reviewed by me.  GEN APPEARANCE: Alert, cooperative. Non-toxic appearing. Well appearing. NAD.  HEAD: Atraumatic, normocephalic   EYES: PERRLa, EOMI, vision grossly intact.   EARS: Gross hearing intact.   NOSE: No nasal discharge, no external evidence of epistaxis.  NECK: Supple  CV: RRR, S1S2, no c/r/m/g. No cyanosis. Extremities warm, well perfused. Cap refill <2 seconds. No bruits.   LUNGS: CTAB. No wheezing. No rales. No rhonchi. No diminished breath sounds.   ABDOMEN: Soft, NTND. No guarding or rebound. No masses.   MSK/EXT: Spine appears normal, no spine point tenderness. No CVA ttp. Normal muscular development. Pelvis stable. No obvious joint or bony deformity, no peripheral edema. +2 DP RLE compartments soft pain w passive flexion of hip, knee no overlying erythema, compartments are soft.   NEURO: Alert, follows commands. Weight bearing normal. Speech normal. Sensation and motor normal x4 extremities.   SKIN: Normal color for race, warm, dry and intact. No evidence of rash.  PSYCH: Normal mood and affect.

## 2023-12-02 NOTE — ED PROVIDER NOTE - PROGRESS NOTE DETAILS
Mao Hernandez MD, PGY2  Imaging nonactionable. Pt able to ambulate, given cane. Feeling better with toradol and tylenol. Will continue taking ibuprofen and tylenol at home. Pt will follow up with her orthopedist. Gianluca rankin. Answered all questions and gave return precautions.

## 2023-12-02 NOTE — ED PROVIDER NOTE - PATIENT PORTAL LINK FT
You can access the FollowMyHealth Patient Portal offered by [facility] by registering at the following website: [email or web address]. By joining [facility]’s FollowMyHealth portal, you will also be able to view your health information using other applications (apps) compatible with our system.

## 2023-12-02 NOTE — ED PROVIDER NOTE - CLINICAL SUMMARY MEDICAL DECISION MAKING FREE TEXT BOX
Sole FISHER: Exam vital signs are stable nontoxic exam with physical exam as above, DDx unclear etiology of patient's right lower extremity pain however low suspicion for acute infectious etiology DVT occult fracture or compartment syndrome his compartments are soft.  Consider possible arthritis or musculoskeletal etiology.  Would also consider possible radiated pain from radiculopathy though patient does not have any back pain.  We will check ultrasound to evaluate for DVT will get x-rays of extremities will give meds as needed and reassess anticipate discharge with close orthopedic/PMD follow-up would also recommend patient to consider seeing a vascular surgeon outpatient for possible peripheral vascular disease however patient does have a good pulse in the distal right lower extremity her extremities appear warm and well-perfused.

## 2023-12-02 NOTE — ED PROVIDER NOTE - NSFOLLOWUPINSTRUCTIONS_ED_ALL_ED_FT
We recommend that you rest, ice and take tylenol(650 mg up to three times a day)/motrin(600 mg up to three times a day) for your symptoms.     -- Please follow-up with your orthopedist in the next 1-2 weeks, but see medical sooner if your symptoms persist or worsen.  Please call tomorrow for an appointment.  If you cannot follow-up with your primary doctor please return to the ED for any urgent issues.  -- You were given a copy of your imaging.  Please go-over these with your doctor(s).   -- If you have any worsening of symptoms or any other concerns please see your doctor or return to the ED immediately.

## 2023-12-02 NOTE — ED PROVIDER NOTE - OBJECTIVE STATEMENT
Sole FISHER: 63-year-old female history of hypertension presents with a chief complaint of worsening right lower extremity pain as well as swelling of the right lower extremity worse at the proximal thigh area.  Patient reports her pain is maximal over the right hip and the right knee worse with ambulation this has been going on for some time has been taking NSAIDs and pregabalin at home without improvement, was followed by orthopedics but unclear what the diagnosis were but she has a known history of arthritis, she reports she can move everything and feel everything, she was going to PT but the pain is getting worse prompting her visit.  Despite the pain she is able to ambulate.  Has not seen a vascular surgeon.  She can move everything and feel everything no rash diffuse here no nausea no vomiting no chest pain no trouble breathing no back pain no abdominal pain no urinary or bowel complaints.

## 2023-12-02 NOTE — ED ADULT NURSE REASSESSMENT NOTE - NS ED NURSE REASSESS COMMENT FT1
Pt at baseline mental status, able to ambulate with cane without assistance. NAD noted, RR even and unlabored. Safety in place

## 2023-12-02 NOTE — ED PROVIDER NOTE - ATTENDING CONTRIBUTION TO CARE
I have personally performed a face to face medical and diagnostic evaluation of the patient. I have discussed with and reviewed the Resident's note and agree with the History, ROS, Physical Exam and MDM unless otherwise indicated. A brief summary of my personal evaluation and impression can be found below.    Sole FISHER: Please see the HPI, ROS, PE and MDM as authored by me.

## 2024-05-04 PROBLEM — E11.9 DIABETES: Status: ACTIVE | Noted: 2021-05-30

## 2024-05-04 PROBLEM — E78.5 DYSLIPIDEMIA: Status: ACTIVE | Noted: 2021-05-30

## 2024-05-04 PROBLEM — E66.9 OBESITY: Status: ACTIVE | Noted: 2021-08-18

## 2024-05-04 PROBLEM — I10 HTN (HYPERTENSION): Status: ACTIVE | Noted: 2021-05-30

## 2024-05-04 PROBLEM — R06.02 SHORTNESS OF BREATH ON EXERTION: Status: ACTIVE | Noted: 2021-12-13

## 2024-05-04 PROBLEM — G47.33 OSA (OBSTRUCTIVE SLEEP APNEA): Status: ACTIVE | Noted: 2021-06-01

## 2024-05-06 ENCOUNTER — APPOINTMENT (OUTPATIENT)
Dept: HEART AND VASCULAR | Facility: CLINIC | Age: 64
End: 2024-05-06
Payer: COMMERCIAL

## 2024-05-06 ENCOUNTER — NON-APPOINTMENT (OUTPATIENT)
Age: 64
End: 2024-05-06

## 2024-05-06 VITALS
OXYGEN SATURATION: 96 % | WEIGHT: 200 LBS | SYSTOLIC BLOOD PRESSURE: 144 MMHG | HEIGHT: 55 IN | DIASTOLIC BLOOD PRESSURE: 81 MMHG | BODY MASS INDEX: 46.29 KG/M2 | HEART RATE: 86 BPM | TEMPERATURE: 97.6 F

## 2024-05-06 VITALS — SYSTOLIC BLOOD PRESSURE: 137 MMHG | DIASTOLIC BLOOD PRESSURE: 84 MMHG

## 2024-05-06 DIAGNOSIS — G47.33 OBSTRUCTIVE SLEEP APNEA (ADULT) (PEDIATRIC): ICD-10-CM

## 2024-05-06 DIAGNOSIS — R06.09 OTHER FORMS OF DYSPNEA: ICD-10-CM

## 2024-05-06 DIAGNOSIS — R06.02 SHORTNESS OF BREATH: ICD-10-CM

## 2024-05-06 DIAGNOSIS — I10 ESSENTIAL (PRIMARY) HYPERTENSION: ICD-10-CM

## 2024-05-06 DIAGNOSIS — E78.5 HYPERLIPIDEMIA, UNSPECIFIED: ICD-10-CM

## 2024-05-06 DIAGNOSIS — E11.9 TYPE 2 DIABETES MELLITUS W/OUT COMPLICATIONS: ICD-10-CM

## 2024-05-06 DIAGNOSIS — E66.9 OBESITY, UNSPECIFIED: ICD-10-CM

## 2024-05-06 PROCEDURE — G2211 COMPLEX E/M VISIT ADD ON: CPT

## 2024-05-06 PROCEDURE — 93000 ELECTROCARDIOGRAM COMPLETE: CPT

## 2024-05-06 PROCEDURE — 99215 OFFICE O/P EST HI 40 MIN: CPT

## 2024-05-06 RX ORDER — SEMAGLUTIDE 0.68 MG/ML
2 INJECTION, SOLUTION SUBCUTANEOUS
Qty: 9 | Refills: 0 | Status: ACTIVE | COMMUNITY
Start: 2024-03-29

## 2024-05-06 RX ORDER — LOSARTAN POTASSIUM AND HYDROCHLOROTHIAZIDE 25; 100 MG/1; MG/1
100-25 TABLET ORAL
Qty: 90 | Refills: 0 | Status: ACTIVE | COMMUNITY
Start: 2024-03-29

## 2024-05-17 ENCOUNTER — OUTPATIENT (OUTPATIENT)
Dept: OUTPATIENT SERVICES | Facility: HOSPITAL | Age: 64
LOS: 1 days | End: 2024-05-17

## 2024-05-17 ENCOUNTER — APPOINTMENT (OUTPATIENT)
Dept: CT IMAGING | Facility: CLINIC | Age: 64
End: 2024-05-17
Payer: COMMERCIAL

## 2024-05-17 ENCOUNTER — NON-APPOINTMENT (OUTPATIENT)
Age: 64
End: 2024-05-17

## 2024-05-17 PROCEDURE — 75574 CT ANGIO HRT W/3D IMAGE: CPT | Mod: 26

## 2024-05-17 NOTE — REVIEW OF SYSTEMS
[SOB] : no shortness of breath [Dyspnea on exertion] : dyspnea during exertion [Chest Discomfort] : no chest discomfort [Snoring] : snoring [Joint Pain] : joint pain [Negative] : Heme/Lymph

## 2024-05-17 NOTE — REASON FOR VISIT
[FreeTextEntry1] : ======================================================================================= Diagnostic Tests: -------------------------------------------------------------- EC24: sinus rhythm, LVH with repolarization abnormalities.  23: sinus bradycardia at 50 bpm, LVH with repolarization abnormalities.   21: sinus rhythm, LVH with repolarization abnormality.  17: sinus bradycardia at 58 bpm, otherwise normal ECG.  -------------------------------------------------------------- Echo: 21: EF 62%, mildly dilated LA.  -------------------------------------------------------------- Sleep Studies: 21: mild NIRAV.

## 2024-05-17 NOTE — ASSESSMENT
[FreeTextEntry1] : ======================================================================================= 1. HTN: BP near ACC/AHA 2017 guideline target: off medications this am:         - continue labetalol 200mg po bid         - hold losartan//25mg po qd (given likely symptomatic hypotension at home)   2. Dyslipidemia: L       - continue atorvastatin 80mg po qd        - discussed therapeutic lifestyle changes to promote improved lipid metabolism        - patient will provide copy of recent lab work from PMD's office for my review       3. DM2: A1c 13.2% (08/03/21):       - continue metformin 1000mg po bid      - continue Ozempic 0.25mg sc q week      - discussed with patient therapeutic lifestyle changes to improve glucose metabolism      - patient will provide copy of recent lab work from PMD's office for my review   4. Obesity, s/p sleeve gastrectomy 02/2022: BMI 48.2: + recent weight gain:       - we had an extensive discussion about therapeutic lifestyle changes to promote increased cardiovascular fitness and achieving goal weight  5. NIRAV: mild        - will pursue weight loss and CV exercise  6. Preoperative for right hip surgery with Emmanuel Powell MD on 05/28/24: (fax [phone] attention Edward Duque):       - she has a history of obesity (s/p sleeve gastrectomy), asthma, DM2, dyslipidemia, HTN, and pelvic kidney       - she is scheduled for an intermediate risk procedure       - the RCRI score (elevated-risk surgery, ischemic heart disease, heart failure, cerebrovascular disease, preoperative treatment with insulin, preoperative creatinine >/= 2) is 1       - she denies chest pain but has CEDEÑO to < 4 METS of exertional capacity       - will send for a CCTA to screen for occult CAD as part of risk assessment   I spent > 45 minutes of total time on this visit, including time spent face-to-face and non-face-to-face.  During this time, I took a relevant history and examined the patient.  I reviewed relevant portions of the medical record and formulated a differential diagnosis and plan.  I explained the relevant cardiac diagnoses to the patient, as well as the work up and management plan.  I answered all questions related to the patient's cardiac conditions.

## 2024-05-17 NOTE — HISTORY OF PRESENT ILLNESS
[FreeTextEntry1] : Ms. Mackenzie presents for follow up and management of obesity (s/p sleeve gastrectomy), asthma, DM2, dyslipidemia, HTN, and pelvic kidney.  She was last seen by me in the office on 11/30/21.  She underwent sleeve gastrectomy on 01/19/22.  She was last seen by me in the office on 03/28/23.  She is preoperative for right hip surgery.  She has gained 13 pounds since last visit.   Her peak weight was 225 pounds and is now 200 pounds.  She has complaints of CEDEÑO to less than 1 flight of stairs.  On 05/17/24, she had a CCTA which revealed normal coronary arteries.

## 2024-05-17 NOTE — HISTORY OF PRESENT ILLNESS
[FreeTextEntry1] : Ms. Mackenzie presents for follow up and management of obesity (s/p sleeve gastrectomy), asthma, DM2, dyslipidemia, HTN, and pelvic kidney.  She was last seen by me in the office on 11/30/21.  She underwent sleeve gastrectomy on 01/19/22.  She was last seen by me in the office on 03/28/23.  She is preoperative for right hip surgery.  She has gained 13 pounds since last visit.   Her peak weight was 225 pounds and is now 200 pounds.  She has complaints of CEDEÑO to less than 1 flight of stairs.

## 2024-05-17 NOTE — ASSESSMENT
[FreeTextEntry1] : ======================================================================================= 1. HTN: BP near ACC/AHA 2017 guideline target: off medications this am:         - continue labetalol 200mg po bid         - hold losartan//25mg po qd (given likely symptomatic hypotension at home)   2. Dyslipidemia: L       - continue atorvastatin 80mg po qd        - discussed therapeutic lifestyle changes to promote improved lipid metabolism        - patient will provide copy of recent lab work from PMD's office for my review       3. DM2: A1c 13.2% (08/03/21):       - continue metformin 1000mg po bid      - continue Ozempic 0.25mg sc q week      - discussed with patient therapeutic lifestyle changes to improve glucose metabolism      - patient will provide copy of recent lab work from PMD's office for my review   4. Obesity, s/p sleeve gastrectomy 02/2022: BMI 48.2: + recent weight gain:       - we had an extensive discussion about therapeutic lifestyle changes to promote increased cardiovascular fitness and achieving goal weight  5. NIRAV: mild        - will pursue weight loss and CV exercise  6. Preoperative for right hip surgery with Emmanuel Powell MD on 05/28/24: (fax [phone] attention Edward Duque):       - she has a history of obesity (s/p sleeve gastrectomy), asthma, DM2, dyslipidemia, HTN, and pelvic kidney       - she is scheduled for an intermediate risk procedure       - the RCRI score (elevated-risk surgery, ischemic heart disease, heart failure, cerebrovascular disease, preoperative treatment with insulin, preoperative creatinine >/= 2) is 1       - she denies chest pain but has CEDEÑO to < 4 METS of exertional capacity       - will send for a CCTA to screen for occult CAD as part of risk assessment   I spent > 45 minutes of total time on this visit, including time spent face-to-face and non-face-to-face.  During this time, I took a relevant history and examined the patient.  I reviewed relevant portions of the medical record and formulated a differential diagnosis and plan.  I explained the relevant cardiac diagnoses to the patient, as well as the work up and management plan.  I answered all questions related to the patient's cardiac conditions.

## 2024-05-17 NOTE — REASON FOR VISIT
[Other: ____] : [unfilled] [FreeTextEntry1] : ======================================================================================= Diagnostic Tests: -------------------------------------------------------------- EC24: sinus rhythm, LVH with repolarization abnormalities.  23: sinus bradycardia at 50 bpm, LVH with repolarization abnormalities.   21: sinus rhythm, LVH with repolarization abnormality.  17: sinus bradycardia at 58 bpm, otherwise normal ECG.  -------------------------------------------------------------- Echo: 21: EF 62%, mildly dilated LA.  -------------------------------------------------------------- Sleep Studies: 21: mild NIRAV.  -------------------------------------------------------------- CT: 24: CCTA: CAC score 0, normal coronary arteries, 3 mm RML lung nodule.

## 2024-05-17 NOTE — ADDENDUM
[FreeTextEntry1] : 05/17/24: Today, 05/17/24, she had a CCTA which revealed normal coronary arteries.  She may, therefore, proceed with surgery without cardiac contraindications.

## 2024-06-19 ENCOUNTER — APPOINTMENT (OUTPATIENT)
Dept: OPHTHALMOLOGY | Facility: CLINIC | Age: 64
End: 2024-06-19

## 2024-08-15 ENCOUNTER — APPOINTMENT (OUTPATIENT)
Dept: OBGYN | Facility: CLINIC | Age: 64
End: 2024-08-15

## 2024-08-15 VITALS
SYSTOLIC BLOOD PRESSURE: 161 MMHG | WEIGHT: 190 LBS | HEART RATE: 71 BPM | HEIGHT: 59 IN | DIASTOLIC BLOOD PRESSURE: 84 MMHG | BODY MASS INDEX: 38.3 KG/M2

## 2024-08-15 DIAGNOSIS — Z01.411 ENCOUNTER FOR GYNECOLOGICAL EXAMINATION (GENERAL) (ROUTINE) WITH ABNORMAL FINDINGS: ICD-10-CM

## 2024-08-15 DIAGNOSIS — N89.8 OTHER SPECIFIED NONINFLAMMATORY DISORDERS OF VAGINA: ICD-10-CM

## 2024-08-15 PROCEDURE — 99213 OFFICE O/P EST LOW 20 MIN: CPT | Mod: 25

## 2024-08-15 PROCEDURE — 99459 PELVIC EXAMINATION: CPT

## 2024-08-15 PROCEDURE — 99386 PREV VISIT NEW AGE 40-64: CPT | Mod: 25

## 2024-08-15 RX ORDER — FAMOTIDINE 40 MG/1
40 TABLET, FILM COATED ORAL
Refills: 0 | Status: ACTIVE | COMMUNITY

## 2024-08-15 NOTE — DISCUSSION/SUMMARY
[FreeTextEntry1] : This note was written by Abdoul Bagley on 08/15/2024 actively solely ANDREIA Black M.D.   All medical record entries made by the Scribe were at my, ANDREIA Valverde M.D. direction and personally dictated by me on 08/15/2024. I have personally reviewed the chart and agree that the record reflects my personal performance of the history, physical exam, assessment, and plan.

## 2024-08-15 NOTE — HISTORY OF PRESENT ILLNESS
[Patient reported mammogram was normal] : Patient reported mammogram was normal [Patient reported bone density results were normal] : Patient reported bone density results were normal [N] : Patient is not sexually active [FreeTextEntry1] : 64-year-old P2 presents as new patient for annual gyn visit, c/o new vaginal irritation and to establish care. LMP post menopause. Pt is feeling well. She denies PMB, itching, burning and abnormal discharge. Offers no complaints regarding bowel movement or urination.   Pt has diabetes and treats with Metformin, HTN and treats with Labetalol, and high cholesterol and treats with Atorvastatin.   [Mammogramdate] : 2024 [BoneDensityDate] : 2024 [ColonoscopyDate] : in 5 years [PGHxTotal] : 2 [[facility] Healthcare[facility]Living] : 2

## 2024-08-15 NOTE — PHYSICAL EXAM
[Chaperone Present] : A chaperone was present in the examining room during all aspects of the physical examination [[id number]] : A chaperone was present during the pelvic exam. 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

## 2024-08-15 NOTE — HISTORY OF PRESENT ILLNESS
[Patient reported mammogram was normal] : Patient reported mammogram was normal [Patient reported bone density results were normal] : Patient reported bone density results were normal [N] : Patient is not sexually active [FreeTextEntry1] : 64-year-old P2 presents as new patient for annual gyn visit, c/o new vaginal irritation and to establish care. LMP post menopause. Pt is feeling well. She denies PMB, itching, burning and abnormal discharge. Offers no complaints regarding bowel movement or urination.   Pt has diabetes and treats with Metformin, HTN and treats with Labetalol, and high cholesterol and treats with Atorvastatin.   [Mammogramdate] : 2024 [BoneDensityDate] : 2024 [ColonoscopyDate] : in 5 years [PGHxTotal] : 2 [[facility] : 2

## 2024-08-15 NOTE — PHYSICAL EXAM
[Chaperone Present] : A chaperone was present in the examining room during all aspects of the physical examination [[id number]] : A chaperone was present during the pelvic exam. 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

## 2024-08-16 LAB — HPV HIGH+LOW RISK DNA PNL CVX: NOT DETECTED

## 2024-08-19 LAB
BV BACTERIA RRNA VAG QL NAA+PROBE: NOT DETECTED
C GLABRATA RNA VAG QL NAA+PROBE: NOT DETECTED
C TRACH RRNA SPEC QL NAA+PROBE: NOT DETECTED
CANDIDA RRNA VAG QL PROBE: DETECTED
N GONORRHOEA RRNA SPEC QL NAA+PROBE: NOT DETECTED
T VAGINALIS RRNA SPEC QL NAA+PROBE: NOT DETECTED

## 2024-08-19 RX ORDER — FLUCONAZOLE 150 MG/1
150 TABLET ORAL
Qty: 3 | Refills: 0 | Status: ACTIVE | COMMUNITY
Start: 2024-08-16 | End: 1900-01-01

## 2024-08-21 LAB — CYTOLOGY CVX/VAG DOC THIN PREP: NORMAL

## 2024-09-02 PROBLEM — Z86.39 HISTORY OF OBESITY: Status: RESOLVED | Noted: 2021-08-18 | Resolved: 2024-09-02

## 2024-09-02 PROBLEM — E11.9 DM2 (DIABETES MELLITUS, TYPE 2): Status: ACTIVE | Noted: 2024-09-02

## 2024-09-02 PROBLEM — Z86.39 HISTORY OF DIABETES MELLITUS: Status: RESOLVED | Noted: 2021-05-30 | Resolved: 2024-09-02

## 2024-09-02 PROBLEM — E78.5 DYSLIPIDEMIA: Status: RESOLVED | Noted: 2021-05-30 | Resolved: 2024-09-02

## 2024-09-02 PROBLEM — E66.9 OBESITY: Status: ACTIVE | Noted: 2024-09-02

## 2024-09-02 PROBLEM — Z86.69 HISTORY OF OBSTRUCTIVE SLEEP APNEA: Status: RESOLVED | Noted: 2021-06-01 | Resolved: 2024-09-02

## 2024-09-02 PROBLEM — R06.09 DOE (DYSPNEA ON EXERTION): Status: RESOLVED | Noted: 2024-05-06 | Resolved: 2024-09-02

## 2024-09-02 PROBLEM — R06.02 SHORTNESS OF BREATH ON EXERTION: Status: RESOLVED | Noted: 2021-12-13 | Resolved: 2024-09-02

## 2024-09-02 PROBLEM — E78.5 DYSLIPIDEMIA: Status: ACTIVE | Noted: 2024-09-02

## 2024-09-02 PROBLEM — I10 HTN (HYPERTENSION): Status: ACTIVE | Noted: 2024-09-02

## 2024-09-02 PROBLEM — Z86.79 HISTORY OF HYPERTENSION: Status: RESOLVED | Noted: 2021-05-30 | Resolved: 2024-09-02

## 2024-09-09 ENCOUNTER — APPOINTMENT (OUTPATIENT)
Dept: HEART AND VASCULAR | Facility: CLINIC | Age: 64
End: 2024-09-09
Payer: COMMERCIAL

## 2024-09-09 VITALS
DIASTOLIC BLOOD PRESSURE: 66 MMHG | BODY MASS INDEX: 38.3 KG/M2 | OXYGEN SATURATION: 93 % | HEIGHT: 59 IN | HEART RATE: 85 BPM | TEMPERATURE: 97.4 F | SYSTOLIC BLOOD PRESSURE: 130 MMHG | WEIGHT: 190 LBS

## 2024-09-09 DIAGNOSIS — R06.02 SHORTNESS OF BREATH: ICD-10-CM

## 2024-09-09 DIAGNOSIS — Z86.39 PERSONAL HISTORY OF OTHER ENDOCRINE, NUTRITIONAL AND METABOLIC DISEASE: ICD-10-CM

## 2024-09-09 DIAGNOSIS — I10 ESSENTIAL (PRIMARY) HYPERTENSION: ICD-10-CM

## 2024-09-09 DIAGNOSIS — E78.5 HYPERLIPIDEMIA, UNSPECIFIED: ICD-10-CM

## 2024-09-09 DIAGNOSIS — E66.9 OBESITY, UNSPECIFIED: ICD-10-CM

## 2024-09-09 DIAGNOSIS — E11.9 TYPE 2 DIABETES MELLITUS W/OUT COMPLICATIONS: ICD-10-CM

## 2024-09-09 DIAGNOSIS — Z86.69 PERSONAL HISTORY OF OTHER DISEASES OF THE NERVOUS SYSTEM AND SENSE ORGANS: ICD-10-CM

## 2024-09-09 DIAGNOSIS — Z86.79 PERSONAL HISTORY OF OTHER DISEASES OF THE CIRCULATORY SYSTEM: ICD-10-CM

## 2024-09-09 DIAGNOSIS — R06.09 OTHER FORMS OF DYSPNEA: ICD-10-CM

## 2024-09-09 PROCEDURE — G2211 COMPLEX E/M VISIT ADD ON: CPT

## 2024-09-09 PROCEDURE — 99214 OFFICE O/P EST MOD 30 MIN: CPT

## 2024-09-09 NOTE — ASSESSMENT
[FreeTextEntry1] : ======================================================================================= 1. HTN: BP near ACC/AHA 2017 guideline target: off medications this am:         - continue labetalol 200mg po bid         - recheck BP next visit on medication  2. Dyslipidemia:        - continue atorvastatin 80mg po qd        - discussed therapeutic lifestyle changes to promote improved lipid metabolism        - check lab work today     3. DM2:       - continue metformin 1000mg po bid      - continue Ozempic 0.25mg sc q week      - discussed with patient therapeutic lifestyle changes to improve glucose metabolism      - check lab work today  4. Obesity, s/p sleeve gastrectomy 02/2022: BMI 48.2: weight stable:       - we had an extensive discussion about therapeutic lifestyle changes to promote increased cardiovascular fitness and achieving goal weight  5. NIRAV: mild        - will pursue weight loss and CV exercise  6. Lung nodule, RML (noted on CCTA 05/17/24):        - has an appointment to see a pulmonologist upcoming

## 2024-09-09 NOTE — HISTORY OF PRESENT ILLNESS
[FreeTextEntry1] : Ms. Mackenzie presents for follow up and management of obesity (s/p sleeve gastrectomy), asthma, DM2, dyslipidemia, HTN, and pelvic kidney.  She was last seen by me in the office on 11/30/21.  She underwent sleeve gastrectomy on 01/19/22.  On 05/17/24, she had a CCTA which revealed normal coronary arteries.  Her weight has stabilized at 190.  She has an upcoming appointment with a pulmonologist, Keila Johnsotn MD, to follow the RML lung nodule.

## 2024-09-09 NOTE — HISTORY OF PRESENT ILLNESS
[FreeTextEntry1] : Ms. Mackenzie presents for follow up and management of obesity (s/p sleeve gastrectomy), asthma, DM2, dyslipidemia, HTN, and pelvic kidney.  She was last seen by me in the office on 11/30/21.  She underwent sleeve gastrectomy on 01/19/22.  On 05/17/24, she had a CCTA which revealed normal coronary arteries.  Her weight has stabilized at 190.  She has an upcoming appointment with a pulmonologist, Keial Johnston MD, to follow the RML lung nodule.

## 2024-09-09 NOTE — REASON FOR VISIT
[FreeTextEntry1] : ======================================================================================= Diagnostic Tests: -------------------------------------------------------------- EC24: sinus rhythm, LVH with repolarization abnormalities.  23: sinus bradycardia at 50 bpm, LVH with repolarization abnormalities.   21: sinus rhythm, LVH with repolarization abnormality.  17: sinus bradycardia at 58 bpm, otherwise normal ECG.  -------------------------------------------------------------- Echo: 21: EF 62%, mildly dilated LA.  -------------------------------------------------------------- Sleep Studies: 21: mild NIRAV.  -------------------------------------------------------------- CT: 24: CCTA: CAC score 0, normal coronary arteries, 3 mm RML lung nodule.

## 2024-09-10 LAB
ALBUMIN SERPL ELPH-MCNC: 4.3 G/DL
ALP BLD-CCNC: 135 U/L
ALT SERPL-CCNC: 7 U/L
ANION GAP SERPL CALC-SCNC: 15 MMOL/L
AST SERPL-CCNC: 12 U/L
BASOPHILS # BLD AUTO: 0.06 K/UL
BASOPHILS NFR BLD AUTO: 0.6 %
BILIRUB SERPL-MCNC: 0.3 MG/DL
BUN SERPL-MCNC: 16 MG/DL
CALCIUM SERPL-MCNC: 10.2 MG/DL
CHLORIDE SERPL-SCNC: 98 MMOL/L
CHOLEST SERPL-MCNC: 222 MG/DL
CO2 SERPL-SCNC: 26 MMOL/L
CREAT SERPL-MCNC: 0.92 MG/DL
EGFR: 70 ML/MIN/1.73M2
EOSINOPHIL # BLD AUTO: 0.18 K/UL
EOSINOPHIL NFR BLD AUTO: 1.8 %
ESTIMATED AVERAGE GLUCOSE: 137 MG/DL
GLUCOSE SERPL-MCNC: 121 MG/DL
HBA1C MFR BLD HPLC: 6.4 %
HCT VFR BLD CALC: 37.3 %
HDLC SERPL-MCNC: 57 MG/DL
HGB BLD-MCNC: 11.1 G/DL
IMM GRANULOCYTES NFR BLD AUTO: 0.2 %
LDLC SERPL DIRECT ASSAY-MCNC: 141 MG/DL
LYMPHOCYTES # BLD AUTO: 2.77 K/UL
LYMPHOCYTES NFR BLD AUTO: 27.3 %
MAN DIFF?: NORMAL
MCHC RBC-ENTMCNC: 25.2 PG
MCHC RBC-ENTMCNC: 29.8 GM/DL
MCV RBC AUTO: 84.6 FL
MONOCYTES # BLD AUTO: 0.71 K/UL
MONOCYTES NFR BLD AUTO: 7 %
NEUTROPHILS # BLD AUTO: 6.42 K/UL
NEUTROPHILS NFR BLD AUTO: 63.1 %
PLATELET # BLD AUTO: 539 K/UL
POTASSIUM SERPL-SCNC: 4.4 MMOL/L
PROT SERPL-MCNC: 7.2 G/DL
RBC # BLD: 4.41 M/UL
RBC # FLD: 16 %
SODIUM SERPL-SCNC: 140 MMOL/L
TRIGL SERPL-MCNC: 148 MG/DL
TSH SERPL-ACNC: 2.26 UIU/ML
WBC # FLD AUTO: 10.16 K/UL

## 2024-10-23 ENCOUNTER — NON-APPOINTMENT (OUTPATIENT)
Age: 64
End: 2024-10-23

## 2024-10-23 ENCOUNTER — APPOINTMENT (OUTPATIENT)
Dept: OPHTHALMOLOGY | Facility: CLINIC | Age: 64
End: 2024-10-23
Payer: COMMERCIAL

## 2024-10-23 PROCEDURE — 99204 OFFICE O/P NEW MOD 45 MIN: CPT

## 2024-10-23 PROCEDURE — 76513 OPH US DX ANT SGM US UNI/BI: CPT

## 2024-10-23 PROCEDURE — 92020 GONIOSCOPY: CPT

## 2024-10-23 PROCEDURE — 92133 CPTRZD OPH DX IMG PST SGM ON: CPT

## 2024-10-23 PROCEDURE — 76514 ECHO EXAM OF EYE THICKNESS: CPT

## 2024-11-26 ENCOUNTER — NON-APPOINTMENT (OUTPATIENT)
Age: 64
End: 2024-11-26

## 2024-11-26 ENCOUNTER — APPOINTMENT (OUTPATIENT)
Dept: OPHTHALMOLOGY | Facility: CLINIC | Age: 64
End: 2024-11-26
Payer: COMMERCIAL

## 2024-11-26 PROCEDURE — 66761 REVISION OF IRIS: CPT | Mod: RT

## 2024-11-27 ENCOUNTER — NON-APPOINTMENT (OUTPATIENT)
Age: 64
End: 2024-11-27

## 2024-11-27 ENCOUNTER — APPOINTMENT (OUTPATIENT)
Dept: PULMONOLOGY | Facility: CLINIC | Age: 64
End: 2024-11-27
Payer: COMMERCIAL

## 2024-11-27 VITALS
HEIGHT: 59 IN | DIASTOLIC BLOOD PRESSURE: 78 MMHG | OXYGEN SATURATION: 90 % | WEIGHT: 190 LBS | BODY MASS INDEX: 38.3 KG/M2 | TEMPERATURE: 98.1 F | SYSTOLIC BLOOD PRESSURE: 138 MMHG

## 2024-11-27 DIAGNOSIS — J44.9 CHRONIC OBSTRUCTIVE PULMONARY DISEASE, UNSPECIFIED: ICD-10-CM

## 2024-11-27 PROCEDURE — G2211 COMPLEX E/M VISIT ADD ON: CPT

## 2024-11-27 PROCEDURE — 99204 OFFICE O/P NEW MOD 45 MIN: CPT

## 2024-11-27 RX ORDER — PROPRANOLOL HYDROCHLORIDE 80 MG/1
80 TABLET ORAL
Refills: 0 | Status: ACTIVE | COMMUNITY

## 2024-11-27 RX ORDER — LOSARTAN POTASSIUM AND HYDROCHLOROTHIAZIDE 100; 25 MG/1; MG/1
100-25 TABLET, FILM COATED ORAL
Refills: 0 | Status: ACTIVE | COMMUNITY

## 2024-12-11 ENCOUNTER — APPOINTMENT (OUTPATIENT)
Dept: OPHTHALMOLOGY | Facility: CLINIC | Age: 64
End: 2024-12-11
Payer: COMMERCIAL

## 2024-12-11 ENCOUNTER — NON-APPOINTMENT (OUTPATIENT)
Age: 64
End: 2024-12-11

## 2024-12-11 PROCEDURE — 66761 REVISION OF IRIS: CPT | Mod: LT

## 2025-01-15 ENCOUNTER — NON-APPOINTMENT (OUTPATIENT)
Age: 65
End: 2025-01-15

## 2025-01-15 ENCOUNTER — APPOINTMENT (OUTPATIENT)
Dept: OPHTHALMOLOGY | Facility: CLINIC | Age: 65
End: 2025-01-15
Payer: COMMERCIAL

## 2025-01-15 PROCEDURE — 92020 GONIOSCOPY: CPT

## 2025-01-15 PROCEDURE — 92014 COMPRE OPH EXAM EST PT 1/>: CPT

## 2025-01-22 ENCOUNTER — APPOINTMENT (OUTPATIENT)
Dept: PULMONOLOGY | Facility: CLINIC | Age: 65
End: 2025-01-22

## 2025-02-12 ENCOUNTER — APPOINTMENT (OUTPATIENT)
Dept: OPHTHALMOLOGY | Facility: CLINIC | Age: 65
End: 2025-02-12
Payer: MEDICARE

## 2025-02-12 ENCOUNTER — NON-APPOINTMENT (OUTPATIENT)
Age: 65
End: 2025-02-12

## 2025-02-12 PROCEDURE — 92002 INTRM OPH EXAM NEW PATIENT: CPT

## 2025-03-11 ENCOUNTER — NON-APPOINTMENT (OUTPATIENT)
Age: 65
End: 2025-03-11

## 2025-03-11 ENCOUNTER — APPOINTMENT (OUTPATIENT)
Dept: PULMONOLOGY | Facility: CLINIC | Age: 65
End: 2025-03-11
Payer: MEDICARE

## 2025-03-11 ENCOUNTER — APPOINTMENT (OUTPATIENT)
Dept: HEART AND VASCULAR | Facility: CLINIC | Age: 65
End: 2025-03-11
Payer: MEDICARE

## 2025-03-11 VITALS
HEART RATE: 55 BPM | OXYGEN SATURATION: 99 % | HEIGHT: 59 IN | TEMPERATURE: 97.5 F | BODY MASS INDEX: 40.79 KG/M2 | WEIGHT: 202.31 LBS | DIASTOLIC BLOOD PRESSURE: 79 MMHG | SYSTOLIC BLOOD PRESSURE: 139 MMHG

## 2025-03-11 DIAGNOSIS — E66.9 OBESITY, UNSPECIFIED: ICD-10-CM

## 2025-03-11 DIAGNOSIS — E78.5 HYPERLIPIDEMIA, UNSPECIFIED: ICD-10-CM

## 2025-03-11 DIAGNOSIS — G25.0 ESSENTIAL TREMOR: ICD-10-CM

## 2025-03-11 DIAGNOSIS — E11.9 TYPE 2 DIABETES MELLITUS W/OUT COMPLICATIONS: ICD-10-CM

## 2025-03-11 DIAGNOSIS — I10 ESSENTIAL (PRIMARY) HYPERTENSION: ICD-10-CM

## 2025-03-11 PROCEDURE — 93306 TTE W/DOPPLER COMPLETE: CPT | Mod: 59

## 2025-03-11 PROCEDURE — 94618 PULMONARY STRESS TESTING: CPT

## 2025-03-11 PROCEDURE — 94729 DIFFUSING CAPACITY: CPT

## 2025-03-11 PROCEDURE — 93000 ELECTROCARDIOGRAM COMPLETE: CPT

## 2025-03-11 PROCEDURE — 99204 OFFICE O/P NEW MOD 45 MIN: CPT | Mod: 25

## 2025-03-11 PROCEDURE — 94726 PLETHYSMOGRAPHY LUNG VOLUMES: CPT

## 2025-03-11 PROCEDURE — 94060 EVALUATION OF WHEEZING: CPT

## 2025-03-12 ENCOUNTER — NON-APPOINTMENT (OUTPATIENT)
Age: 65
End: 2025-03-12

## 2025-04-11 ENCOUNTER — APPOINTMENT (OUTPATIENT)
Dept: PULMONOLOGY | Facility: CLINIC | Age: 65
End: 2025-04-11
Payer: MEDICARE

## 2025-04-11 DIAGNOSIS — J44.9 CHRONIC OBSTRUCTIVE PULMONARY DISEASE, UNSPECIFIED: ICD-10-CM

## 2025-04-11 PROCEDURE — 99214 OFFICE O/P EST MOD 30 MIN: CPT | Mod: 93

## 2025-04-11 PROCEDURE — G2211 COMPLEX E/M VISIT ADD ON: CPT | Mod: 93

## 2025-04-11 RX ORDER — FLUTICASONE FUROATE, UMECLIDINIUM BROMIDE AND VILANTEROL TRIFENATATE 200; 62.5; 25 UG/1; UG/1; UG/1
200-62.5-25 POWDER RESPIRATORY (INHALATION)
Qty: 60 | Refills: 2 | Status: ACTIVE | COMMUNITY
Start: 2025-04-11 | End: 1900-01-01

## 2025-06-16 ENCOUNTER — APPOINTMENT (OUTPATIENT)
Dept: PULMONOLOGY | Facility: CLINIC | Age: 65
End: 2025-06-16
Payer: MEDICARE

## 2025-06-16 VITALS
WEIGHT: 211 LBS | HEIGHT: 59 IN | HEART RATE: 58 BPM | SYSTOLIC BLOOD PRESSURE: 136 MMHG | BODY MASS INDEX: 42.54 KG/M2 | OXYGEN SATURATION: 96 % | DIASTOLIC BLOOD PRESSURE: 62 MMHG

## 2025-06-16 PROCEDURE — G2211 COMPLEX E/M VISIT ADD ON: CPT

## 2025-06-16 PROCEDURE — 99214 OFFICE O/P EST MOD 30 MIN: CPT

## 2025-06-17 ENCOUNTER — APPOINTMENT (OUTPATIENT)
Dept: HEART AND VASCULAR | Facility: CLINIC | Age: 65
End: 2025-06-17
Payer: MEDICARE

## 2025-06-17 VITALS
SYSTOLIC BLOOD PRESSURE: 140 MMHG | HEIGHT: 59 IN | OXYGEN SATURATION: 98 % | DIASTOLIC BLOOD PRESSURE: 74 MMHG | HEART RATE: 58 BPM | TEMPERATURE: 97.7 F

## 2025-06-17 VITALS — SYSTOLIC BLOOD PRESSURE: 100 MMHG | DIASTOLIC BLOOD PRESSURE: 87 MMHG

## 2025-06-17 PROBLEM — G47.33 OBSTRUCTIVE SLEEP APNEA, ADULT: Status: ACTIVE | Noted: 2025-06-16

## 2025-06-17 PROCEDURE — G2211 COMPLEX E/M VISIT ADD ON: CPT

## 2025-06-17 PROCEDURE — 99214 OFFICE O/P EST MOD 30 MIN: CPT

## 2025-06-17 RX ORDER — PANTOPRAZOLE 40 MG/1
40 TABLET, DELAYED RELEASE ORAL
Qty: 30 | Refills: 0 | Status: ACTIVE | COMMUNITY
Start: 2025-06-14

## 2025-06-30 ENCOUNTER — OUTPATIENT (OUTPATIENT)
Dept: OUTPATIENT SERVICES | Facility: HOSPITAL | Age: 65
LOS: 1 days | End: 2025-06-30
Payer: MEDICARE

## 2025-06-30 ENCOUNTER — APPOINTMENT (OUTPATIENT)
Dept: SLEEP CENTER | Facility: HOME HEALTH | Age: 65
End: 2025-06-30

## 2025-06-30 PROCEDURE — 95800 SLP STDY UNATTENDED: CPT | Mod: 26

## 2025-06-30 PROCEDURE — 95800 SLP STDY UNATTENDED: CPT

## 2025-07-08 DIAGNOSIS — G47.33 OBSTRUCTIVE SLEEP APNEA (ADULT) (PEDIATRIC): ICD-10-CM

## 2025-07-25 ENCOUNTER — APPOINTMENT (OUTPATIENT)
Dept: PULMONOLOGY | Facility: CLINIC | Age: 65
End: 2025-07-25
Payer: MEDICARE

## 2025-07-25 PROCEDURE — 99214 OFFICE O/P EST MOD 30 MIN: CPT | Mod: 2W

## 2025-08-13 ENCOUNTER — NON-APPOINTMENT (OUTPATIENT)
Age: 65
End: 2025-08-13

## 2025-08-13 ENCOUNTER — APPOINTMENT (OUTPATIENT)
Dept: OPHTHALMOLOGY | Facility: CLINIC | Age: 65
End: 2025-08-13
Payer: MEDICARE

## 2025-08-13 PROCEDURE — 92020 GONIOSCOPY: CPT

## 2025-08-13 PROCEDURE — 99214 OFFICE O/P EST MOD 30 MIN: CPT

## 2025-08-22 ENCOUNTER — OUTPATIENT (OUTPATIENT)
Dept: OUTPATIENT SERVICES | Facility: HOSPITAL | Age: 65
LOS: 1 days | End: 2025-08-22
Payer: MEDICARE

## 2025-08-22 DIAGNOSIS — Z00.00 ENCOUNTER FOR GENERAL ADULT MEDICAL EXAMINATION WITHOUT ABNORMAL FINDINGS: ICD-10-CM

## 2025-08-22 PROCEDURE — 94621 CARDIOPULM EXERCISE TESTING: CPT

## 2025-08-22 PROCEDURE — 94621 CARDIOPULM EXERCISE TESTING: CPT | Mod: 26

## 2025-09-04 ENCOUNTER — APPOINTMENT (OUTPATIENT)
Dept: PULMONOLOGY | Facility: CLINIC | Age: 65
End: 2025-09-04
Payer: MEDICARE

## 2025-09-04 DIAGNOSIS — J44.9 CHRONIC OBSTRUCTIVE PULMONARY DISEASE, UNSPECIFIED: ICD-10-CM

## 2025-09-04 DIAGNOSIS — R06.02 SHORTNESS OF BREATH: ICD-10-CM

## 2025-09-04 DIAGNOSIS — G47.33 OBSTRUCTIVE SLEEP APNEA (ADULT) (PEDIATRIC): ICD-10-CM

## 2025-09-04 PROCEDURE — G2211 COMPLEX E/M VISIT ADD ON: CPT | Mod: 93

## 2025-09-04 PROCEDURE — 99214 OFFICE O/P EST MOD 30 MIN: CPT | Mod: 93

## 2025-09-10 ENCOUNTER — TRANSCRIPTION ENCOUNTER (OUTPATIENT)
Age: 65
End: 2025-09-10